# Patient Record
Sex: FEMALE | Race: WHITE | NOT HISPANIC OR LATINO
[De-identification: names, ages, dates, MRNs, and addresses within clinical notes are randomized per-mention and may not be internally consistent; named-entity substitution may affect disease eponyms.]

---

## 2017-04-17 ENCOUNTER — RX RENEWAL (OUTPATIENT)
Age: 22
End: 2017-04-17

## 2017-04-18 ENCOUNTER — RX RENEWAL (OUTPATIENT)
Age: 22
End: 2017-04-18

## 2017-05-18 ENCOUNTER — OUTPATIENT (OUTPATIENT)
Dept: OUTPATIENT SERVICES | Facility: HOSPITAL | Age: 22
LOS: 1 days | End: 2017-05-18
Payer: MEDICAID

## 2017-05-18 ENCOUNTER — APPOINTMENT (OUTPATIENT)
Dept: ENDOCRINOLOGY | Facility: HOSPITAL | Age: 22
End: 2017-05-18

## 2017-05-18 VITALS
DIASTOLIC BLOOD PRESSURE: 79 MMHG | BODY MASS INDEX: 23.05 KG/M2 | HEIGHT: 64 IN | WEIGHT: 135 LBS | SYSTOLIC BLOOD PRESSURE: 117 MMHG | RESPIRATION RATE: 16 BRPM

## 2017-05-18 DIAGNOSIS — E10.9 TYPE 1 DIABETES MELLITUS WITHOUT COMPLICATIONS: ICD-10-CM

## 2017-05-18 DIAGNOSIS — E34.9 ENDOCRINE DISORDER, UNSPECIFIED: ICD-10-CM

## 2017-05-18 LAB
24R-OH-CALCIDIOL SERPL-MCNC: 32.4 NG/ML — SIGNIFICANT CHANGE UP (ref 30–100)
ALBUMIN SERPL ELPH-MCNC: 4.2 G/DL — SIGNIFICANT CHANGE UP (ref 3.3–5)
ALP SERPL-CCNC: 51 U/L — SIGNIFICANT CHANGE UP (ref 40–120)
ALT FLD-CCNC: 12 U/L — SIGNIFICANT CHANGE UP (ref 10–45)
ANION GAP SERPL CALC-SCNC: 13 MMOL/L — SIGNIFICANT CHANGE UP (ref 5–17)
AST SERPL-CCNC: 20 U/L — SIGNIFICANT CHANGE UP (ref 10–40)
BILIRUB SERPL-MCNC: 0.4 MG/DL — SIGNIFICANT CHANGE UP (ref 0.2–1.2)
BUN SERPL-MCNC: 13 MG/DL — SIGNIFICANT CHANGE UP (ref 7–23)
CALCIUM SERPL-MCNC: 9 MG/DL — SIGNIFICANT CHANGE UP (ref 8.4–10.5)
CHLORIDE SERPL-SCNC: 98 MMOL/L — SIGNIFICANT CHANGE UP (ref 96–108)
CO2 SERPL-SCNC: 24 MMOL/L — SIGNIFICANT CHANGE UP (ref 22–31)
CREAT ?TM UR-MCNC: 164 MG/DL — SIGNIFICANT CHANGE UP
CREAT SERPL-MCNC: 0.79 MG/DL — SIGNIFICANT CHANGE UP (ref 0.5–1.3)
GLUCOSE SERPL-MCNC: 434 MG/DL — HIGH (ref 70–99)
HBA1C BLD-MCNC: 7.3 % — HIGH (ref 4–5.6)
MICROALBUMIN UR-MCNC: 5.4 MG/DL — SIGNIFICANT CHANGE UP
MICROALBUMIN/CREAT UR-RTO: 33 — SIGNIFICANT CHANGE UP
POTASSIUM SERPL-MCNC: 5.2 MMOL/L — SIGNIFICANT CHANGE UP (ref 3.5–5.3)
POTASSIUM SERPL-SCNC: 5.2 MMOL/L — SIGNIFICANT CHANGE UP (ref 3.5–5.3)
PROT SERPL-MCNC: 7.5 G/DL — SIGNIFICANT CHANGE UP (ref 6–8.3)
SODIUM SERPL-SCNC: 135 MMOL/L — SIGNIFICANT CHANGE UP (ref 135–145)
T4 FREE SERPL-MCNC: 1.3 NG/DL — SIGNIFICANT CHANGE UP (ref 0.9–1.8)
TSH SERPL-MCNC: 0.86 UIU/ML — SIGNIFICANT CHANGE UP (ref 0.27–4.2)

## 2017-05-18 PROCEDURE — 84443 ASSAY THYROID STIM HORMONE: CPT

## 2017-05-18 PROCEDURE — 80053 COMPREHEN METABOLIC PANEL: CPT

## 2017-05-18 PROCEDURE — 82306 VITAMIN D 25 HYDROXY: CPT

## 2017-05-18 PROCEDURE — G0463: CPT

## 2017-05-18 PROCEDURE — 82043 UR ALBUMIN QUANTITATIVE: CPT

## 2017-05-18 PROCEDURE — 83036 HEMOGLOBIN GLYCOSYLATED A1C: CPT

## 2017-05-18 PROCEDURE — 36415 COLL VENOUS BLD VENIPUNCTURE: CPT

## 2017-05-18 PROCEDURE — 84439 ASSAY OF FREE THYROXINE: CPT

## 2017-06-01 ENCOUNTER — RESULT REVIEW (OUTPATIENT)
Age: 22
End: 2017-06-01

## 2017-06-15 ENCOUNTER — RX RENEWAL (OUTPATIENT)
Age: 22
End: 2017-06-15

## 2017-06-22 ENCOUNTER — APPOINTMENT (OUTPATIENT)
Dept: PEDIATRIC ADOLESCENT MEDICINE | Facility: HOSPITAL | Age: 22
End: 2017-06-22

## 2017-06-22 VITALS — DIASTOLIC BLOOD PRESSURE: 61 MMHG | SYSTOLIC BLOOD PRESSURE: 126 MMHG | WEIGHT: 134 LBS | HEART RATE: 83 BPM

## 2017-06-22 DIAGNOSIS — N94.6 DYSMENORRHEA, UNSPECIFIED: ICD-10-CM

## 2017-06-27 ENCOUNTER — RX RENEWAL (OUTPATIENT)
Age: 22
End: 2017-06-27

## 2017-06-29 ENCOUNTER — RX RENEWAL (OUTPATIENT)
Age: 22
End: 2017-06-29

## 2017-09-26 ENCOUNTER — OTHER (OUTPATIENT)
Age: 22
End: 2017-09-26

## 2017-10-26 ENCOUNTER — RX RENEWAL (OUTPATIENT)
Age: 22
End: 2017-10-26

## 2017-12-07 ENCOUNTER — LABORATORY RESULT (OUTPATIENT)
Age: 22
End: 2017-12-07

## 2017-12-07 ENCOUNTER — OUTPATIENT (OUTPATIENT)
Dept: OUTPATIENT SERVICES | Facility: HOSPITAL | Age: 22
LOS: 1 days | End: 2017-12-07
Payer: MEDICAID

## 2017-12-07 ENCOUNTER — TRANSCRIPTION ENCOUNTER (OUTPATIENT)
Age: 22
End: 2017-12-07

## 2017-12-07 ENCOUNTER — APPOINTMENT (OUTPATIENT)
Dept: ENDOCRINOLOGY | Facility: HOSPITAL | Age: 22
End: 2017-12-07
Payer: MEDICAID

## 2017-12-07 VITALS
WEIGHT: 131 LBS | HEART RATE: 92 BPM | RESPIRATION RATE: 14 BRPM | BODY MASS INDEX: 22.36 KG/M2 | HEIGHT: 64 IN | DIASTOLIC BLOOD PRESSURE: 86 MMHG | SYSTOLIC BLOOD PRESSURE: 132 MMHG

## 2017-12-07 DIAGNOSIS — E06.9 THYROIDITIS, UNSPECIFIED: ICD-10-CM

## 2017-12-07 LAB
CHOLEST SERPL-MCNC: 134 MG/DL — SIGNIFICANT CHANGE UP (ref 10–199)
CREAT ?TM UR-MCNC: 196 MG/DL — SIGNIFICANT CHANGE UP
HBA1C BLD-MCNC: 6.8 % — HIGH (ref 4–5.6)
HDLC SERPL-MCNC: 57 MG/DL — SIGNIFICANT CHANGE UP (ref 40–125)
LIPID PNL WITH DIRECT LDL SERPL: 62 MG/DL — SIGNIFICANT CHANGE UP
MICROALBUMIN UR-MCNC: 1.7 MG/DL — SIGNIFICANT CHANGE UP
MICROALBUMIN/CREAT UR-RTO: 9 MG/G — SIGNIFICANT CHANGE UP (ref 0–30)
TOTAL CHOLESTEROL/HDL RATIO MEASUREMENT: 2.4 RATIO — LOW (ref 3.3–7.1)
TRIGL SERPL-MCNC: 77 MG/DL — SIGNIFICANT CHANGE UP (ref 10–149)

## 2017-12-07 PROCEDURE — 82043 UR ALBUMIN QUANTITATIVE: CPT

## 2017-12-07 PROCEDURE — 83036 HEMOGLOBIN GLYCOSYLATED A1C: CPT

## 2017-12-07 PROCEDURE — 80061 LIPID PANEL: CPT

## 2017-12-07 PROCEDURE — 99213 OFFICE O/P EST LOW 20 MIN: CPT

## 2017-12-07 PROCEDURE — G0463: CPT

## 2017-12-12 DIAGNOSIS — E10.9 TYPE 1 DIABETES MELLITUS WITHOUT COMPLICATIONS: ICD-10-CM

## 2017-12-21 ENCOUNTER — APPOINTMENT (OUTPATIENT)
Dept: PEDIATRIC ADOLESCENT MEDICINE | Facility: HOSPITAL | Age: 22
End: 2017-12-21
Payer: MEDICAID

## 2017-12-21 VITALS — DIASTOLIC BLOOD PRESSURE: 72 MMHG | HEART RATE: 85 BPM | SYSTOLIC BLOOD PRESSURE: 130 MMHG | WEIGHT: 131 LBS

## 2017-12-21 PROCEDURE — 99212 OFFICE O/P EST SF 10 MIN: CPT

## 2017-12-21 RX ORDER — DIPHENHYDRAMINE HCL 50 MG/1
50 CAPSULE ORAL EVERY 6 HOURS
Refills: 0 | Status: DISCONTINUED | COMMUNITY
Start: 2017-12-07

## 2017-12-28 ENCOUNTER — OTHER (OUTPATIENT)
Age: 22
End: 2017-12-28

## 2018-01-11 ENCOUNTER — APPOINTMENT (OUTPATIENT)
Dept: DERMATOLOGY | Facility: HOSPITAL | Age: 23
End: 2018-01-11
Payer: MEDICAID

## 2018-01-11 ENCOUNTER — OUTPATIENT (OUTPATIENT)
Dept: OUTPATIENT SERVICES | Facility: HOSPITAL | Age: 23
LOS: 1 days | End: 2018-01-11
Payer: MEDICAID

## 2018-01-11 VITALS
HEIGHT: 64 IN | WEIGHT: 131 LBS | BODY MASS INDEX: 22.36 KG/M2 | RESPIRATION RATE: 16 BRPM | HEART RATE: 91 BPM | DIASTOLIC BLOOD PRESSURE: 78 MMHG | SYSTOLIC BLOOD PRESSURE: 133 MMHG

## 2018-01-11 DIAGNOSIS — L30.9 DERMATITIS, UNSPECIFIED: ICD-10-CM

## 2018-01-11 DIAGNOSIS — L98.9 DISORDER OF THE SKIN AND SUBCUTANEOUS TISSUE, UNSPECIFIED: ICD-10-CM

## 2018-01-11 DIAGNOSIS — B07.9 VIRAL WART, UNSPECIFIED: ICD-10-CM

## 2018-01-11 PROCEDURE — 99213 OFFICE O/P EST LOW 20 MIN: CPT

## 2018-01-11 PROCEDURE — G0463: CPT

## 2018-01-22 ENCOUNTER — MEDICATION RENEWAL (OUTPATIENT)
Age: 23
End: 2018-01-22

## 2018-03-08 ENCOUNTER — MOBILE ON CALL (OUTPATIENT)
Age: 23
End: 2018-03-08

## 2018-03-08 ENCOUNTER — OUTPATIENT (OUTPATIENT)
Dept: OUTPATIENT SERVICES | Facility: HOSPITAL | Age: 23
LOS: 1 days | End: 2018-03-08
Payer: MEDICAID

## 2018-03-08 ENCOUNTER — APPOINTMENT (OUTPATIENT)
Dept: DERMATOLOGY | Facility: HOSPITAL | Age: 23
End: 2018-03-08

## 2018-03-08 VITALS
HEART RATE: 114 BPM | BODY MASS INDEX: 23.05 KG/M2 | RESPIRATION RATE: 14 BRPM | WEIGHT: 135 LBS | SYSTOLIC BLOOD PRESSURE: 139 MMHG | HEIGHT: 64 IN | DIASTOLIC BLOOD PRESSURE: 87 MMHG

## 2018-03-08 DIAGNOSIS — R23.8 OTHER SKIN CHANGES: ICD-10-CM

## 2018-03-08 DIAGNOSIS — B07.9 VIRAL WART, UNSPECIFIED: ICD-10-CM

## 2018-03-08 DIAGNOSIS — L98.9 DISORDER OF THE SKIN AND SUBCUTANEOUS TISSUE, UNSPECIFIED: ICD-10-CM

## 2018-03-08 PROCEDURE — G0463: CPT

## 2018-03-08 PROCEDURE — 17110 DESTRUCTION B9 LES UP TO 14: CPT

## 2018-04-09 ENCOUNTER — RX RENEWAL (OUTPATIENT)
Age: 23
End: 2018-04-09

## 2018-05-11 ENCOUNTER — OTHER (OUTPATIENT)
Age: 23
End: 2018-05-11

## 2018-06-05 ENCOUNTER — APPOINTMENT (OUTPATIENT)
Dept: PLASTIC SURGERY | Facility: HOSPITAL | Age: 23
End: 2018-06-05

## 2018-07-11 ENCOUNTER — APPOINTMENT (OUTPATIENT)
Dept: PEDIATRIC ADOLESCENT MEDICINE | Facility: HOSPITAL | Age: 23
End: 2018-07-11

## 2018-08-16 ENCOUNTER — OUTPATIENT (OUTPATIENT)
Dept: OUTPATIENT SERVICES | Facility: HOSPITAL | Age: 23
LOS: 1 days | End: 2018-08-16
Payer: MEDICAID

## 2018-08-16 ENCOUNTER — APPOINTMENT (OUTPATIENT)
Dept: ENDOCRINOLOGY | Facility: HOSPITAL | Age: 23
End: 2018-08-16
Payer: MEDICAID

## 2018-08-16 VITALS
WEIGHT: 128 LBS | HEART RATE: 79 BPM | SYSTOLIC BLOOD PRESSURE: 117 MMHG | HEIGHT: 64 IN | BODY MASS INDEX: 21.85 KG/M2 | RESPIRATION RATE: 16 BRPM | DIASTOLIC BLOOD PRESSURE: 76 MMHG

## 2018-08-16 DIAGNOSIS — E34.9 ENDOCRINE DISORDER, UNSPECIFIED: ICD-10-CM

## 2018-08-16 DIAGNOSIS — E10.9 TYPE 1 DIABETES MELLITUS WITHOUT COMPLICATIONS: ICD-10-CM

## 2018-08-16 LAB
ALBUMIN SERPL ELPH-MCNC: 4.8 G/DL — SIGNIFICANT CHANGE UP (ref 3.3–5)
ALP SERPL-CCNC: 55 U/L — SIGNIFICANT CHANGE UP (ref 30–120)
ALT FLD-CCNC: 12 U/L — SIGNIFICANT CHANGE UP (ref 10–45)
AST SERPL-CCNC: 19 U/L — SIGNIFICANT CHANGE UP (ref 10–40)
BILIRUB SERPL-MCNC: 0.4 MG/DL — SIGNIFICANT CHANGE UP (ref 0.2–1.2)
BUN SERPL-MCNC: 10 MG/DL — SIGNIFICANT CHANGE UP (ref 7–23)
CALCIUM SERPL-MCNC: 9.6 MG/DL — SIGNIFICANT CHANGE UP (ref 8.4–10.5)
CHLORIDE SERPL-SCNC: 98 MMOL/L — SIGNIFICANT CHANGE UP (ref 96–108)
CHOLEST SERPL-MCNC: 140 MG/DL — SIGNIFICANT CHANGE UP (ref 10–199)
CO2 SERPL-SCNC: 25 MMOL/L — SIGNIFICANT CHANGE UP (ref 22–31)
CREAT ?TM UR-MCNC: 126 MG/DL — SIGNIFICANT CHANGE UP
CREAT SERPL-MCNC: 0.67 MG/DL — SIGNIFICANT CHANGE UP (ref 0.5–1.3)
GLUCOSE SERPL-MCNC: 208 MG/DL — HIGH (ref 70–99)
HBA1C BLD-MCNC: 7.2 % — HIGH (ref 4–5.6)
HDLC SERPL-MCNC: 54 MG/DL — SIGNIFICANT CHANGE UP
LIPID PNL WITH DIRECT LDL SERPL: 73 MG/DL — SIGNIFICANT CHANGE UP
MICROALBUMIN UR-MCNC: 1.3 MG/DL — SIGNIFICANT CHANGE UP
MICROALBUMIN/CREAT UR-RTO: 10 MG/G — SIGNIFICANT CHANGE UP (ref 0–30)
POTASSIUM SERPL-MCNC: 4.5 MMOL/L — SIGNIFICANT CHANGE UP (ref 3.5–5.3)
POTASSIUM SERPL-SCNC: 4.5 MMOL/L — SIGNIFICANT CHANGE UP (ref 3.5–5.3)
PROT SERPL-MCNC: 7.6 G/DL — SIGNIFICANT CHANGE UP (ref 6–8.3)
SODIUM SERPL-SCNC: 138 MMOL/L — SIGNIFICANT CHANGE UP (ref 135–145)
TOTAL CHOLESTEROL/HDL RATIO MEASUREMENT: 2.6 RATIO — LOW (ref 3.3–7.1)
TRIGL SERPL-MCNC: 64 MG/DL — SIGNIFICANT CHANGE UP (ref 10–149)
TSH SERPL-MCNC: 1.12 UIU/ML — SIGNIFICANT CHANGE UP (ref 0.27–4.2)

## 2018-08-16 PROCEDURE — 99214 OFFICE O/P EST MOD 30 MIN: CPT | Mod: GC

## 2018-08-16 PROCEDURE — 82043 UR ALBUMIN QUANTITATIVE: CPT

## 2018-08-16 PROCEDURE — 83036 HEMOGLOBIN GLYCOSYLATED A1C: CPT

## 2018-08-16 PROCEDURE — 80053 COMPREHEN METABOLIC PANEL: CPT

## 2018-08-16 PROCEDURE — 80061 LIPID PANEL: CPT

## 2018-08-16 PROCEDURE — 84443 ASSAY THYROID STIM HORMONE: CPT

## 2018-08-16 PROCEDURE — G0463: CPT

## 2018-09-05 ENCOUNTER — APPOINTMENT (OUTPATIENT)
Dept: COLORECTAL SURGERY | Facility: CLINIC | Age: 23
End: 2018-09-05
Payer: MEDICAID

## 2018-09-05 VITALS
HEIGHT: 65 IN | BODY MASS INDEX: 21.33 KG/M2 | SYSTOLIC BLOOD PRESSURE: 115 MMHG | HEART RATE: 89 BPM | DIASTOLIC BLOOD PRESSURE: 81 MMHG | OXYGEN SATURATION: 97 % | WEIGHT: 128 LBS | RESPIRATION RATE: 14 BRPM

## 2018-09-05 DIAGNOSIS — K64.4 RESIDUAL HEMORRHOIDAL SKIN TAGS: ICD-10-CM

## 2018-09-05 DIAGNOSIS — Z87.2 PERSONAL HISTORY OF DISEASES OF THE SKIN AND SUBCUTANEOUS TISSUE: ICD-10-CM

## 2018-09-05 PROCEDURE — 99243 OFF/OP CNSLTJ NEW/EST LOW 30: CPT

## 2018-09-05 RX ORDER — 70%ISOPROPYL ALCOHOL 0.7 ML/ML
70 SWAB TOPICAL
Qty: 200 | Refills: 2 | Status: DISCONTINUED | COMMUNITY
Start: 2017-04-17 | End: 2018-09-05

## 2018-09-06 ENCOUNTER — OTHER (OUTPATIENT)
Age: 23
End: 2018-09-06

## 2018-09-06 DIAGNOSIS — R09.81 NASAL CONGESTION: ICD-10-CM

## 2018-09-12 ENCOUNTER — APPOINTMENT (OUTPATIENT)
Dept: PODIATRY | Facility: HOSPITAL | Age: 23
End: 2018-09-12

## 2018-09-17 ENCOUNTER — APPOINTMENT (OUTPATIENT)
Dept: OPHTHALMOLOGY | Facility: CLINIC | Age: 23
End: 2018-09-17

## 2018-09-27 ENCOUNTER — MED ADMIN CHARGE (OUTPATIENT)
Age: 23
End: 2018-09-27

## 2018-11-02 ENCOUNTER — RX RENEWAL (OUTPATIENT)
Age: 23
End: 2018-11-02

## 2018-12-10 ENCOUNTER — APPOINTMENT (OUTPATIENT)
Dept: PEDIATRIC ADOLESCENT MEDICINE | Facility: HOSPITAL | Age: 23
End: 2018-12-10
Payer: MEDICAID

## 2018-12-10 VITALS
WEIGHT: 124 LBS | HEIGHT: 64.37 IN | BODY MASS INDEX: 21.17 KG/M2 | HEART RATE: 75 BPM | SYSTOLIC BLOOD PRESSURE: 115 MMHG | DIASTOLIC BLOOD PRESSURE: 75 MMHG

## 2018-12-10 DIAGNOSIS — R63.4 ABNORMAL WEIGHT LOSS: ICD-10-CM

## 2018-12-10 PROCEDURE — 99213 OFFICE O/P EST LOW 20 MIN: CPT

## 2018-12-10 NOTE — PHYSICAL EXAM
[Regular Rate and Rhythm] : regular rate and rhythm [Normal S1, S2 audible] : normal S1, S2 audible [Soft] : soft [NonTender] : non tender [FreeTextEntry1] : no distress [FreeTextEntry7] : clear [FreeTextEntry8] : marryr [de-identified] : severe eczema on her entire body

## 2018-12-10 NOTE — DISCUSSION/SUMMARY
[FreeTextEntry1] : Eczema:  mother is a pharmacist and I was able to renew medications\par \par Weight loss:  Mother has appt with endocrine in 2/2019 and will discuss the matter with them.\par \par Type 1 DM:  appears to be well controlled.  mother to use sliding scale\par \par Completed form to support need for a one on one aid at all times

## 2018-12-10 NOTE — HISTORY OF PRESENT ILLNESS
[de-identified] : Eczema and form completed [FreeTextEntry6] : Patient with multiple medical issues who is uncommunicative to her Fragile X Syndrome , anxiety and a learning disorder.  Patient also has severe eczema and Type ! Diabetes Mellitus.  Mother is requesting a refill of patient's hydrocortisone ointment and completion of a form that will support the continuation of a one on one aid for the patient.  Patient has not been hospitalized in past year and has not required any acute ER visits.\par \par Noted on review of vital signs patient has been slowly losing weight over past few years.  maximum 140 pounds and now weighing 124 pounds.  Mother reports excellent appetite and food intake.  Diabetes under good control

## 2018-12-17 ENCOUNTER — RX RENEWAL (OUTPATIENT)
Age: 23
End: 2018-12-17

## 2018-12-27 ENCOUNTER — RX RENEWAL (OUTPATIENT)
Age: 23
End: 2018-12-27

## 2019-01-03 ENCOUNTER — MEDICATION RENEWAL (OUTPATIENT)
Age: 24
End: 2019-01-03

## 2019-01-28 ENCOUNTER — MEDICATION RENEWAL (OUTPATIENT)
Age: 24
End: 2019-01-28

## 2019-02-14 ENCOUNTER — OUTPATIENT (OUTPATIENT)
Dept: OUTPATIENT SERVICES | Facility: HOSPITAL | Age: 24
LOS: 1 days | End: 2019-02-14
Payer: MEDICAID

## 2019-02-14 ENCOUNTER — APPOINTMENT (OUTPATIENT)
Dept: ENDOCRINOLOGY | Facility: HOSPITAL | Age: 24
End: 2019-02-14
Payer: MEDICAID

## 2019-02-14 VITALS
HEART RATE: 8 BPM | BODY MASS INDEX: 20.83 KG/M2 | DIASTOLIC BLOOD PRESSURE: 78 MMHG | WEIGHT: 125 LBS | SYSTOLIC BLOOD PRESSURE: 129 MMHG | HEIGHT: 65 IN

## 2019-02-14 DIAGNOSIS — E10.9 TYPE 1 DIABETES MELLITUS WITHOUT COMPLICATIONS: ICD-10-CM

## 2019-02-14 DIAGNOSIS — E34.9 ENDOCRINE DISORDER, UNSPECIFIED: ICD-10-CM

## 2019-02-14 LAB — HBA1C BLD-MCNC: 6.7 % — HIGH (ref 4–5.6)

## 2019-02-14 PROCEDURE — G0463: CPT

## 2019-02-14 PROCEDURE — 86364 TISS TRNSGLTMNASE EA IG CLAS: CPT

## 2019-02-14 PROCEDURE — 83036 HEMOGLOBIN GLYCOSYLATED A1C: CPT

## 2019-02-14 PROCEDURE — 99214 OFFICE O/P EST MOD 30 MIN: CPT

## 2019-02-14 NOTE — HISTORY OF PRESENT ILLNESS
[FreeTextEntry1] : 23 year old female here for DM 1 and fragile X syndrome. \par DM 1 diagnosed at 6 years old. Controlled, A1C 7.2 Aug 2018. No nephropathy, no retinopathy, no neuropathy\par Takes levemir 21U units  and humalog based on insulin: Carb ratio as follows:\par 1:6 ac breakfast \par 1:7 ac lunch \par 1:6 ac dinner\par Francine factor 1:40\par \par TID: AM-80-180s with occasional 200s pre lunch:70s-200s with occasional 40s-50s predinner 100-200 HS .  Patient always has a bedtime snack. She may be more physically active or eating less during the days she has hypoglycemia (3-4 X month)\par The patient does not want the medic alert bracelet.\par Diet: regular food, no sugar\par Last dilated eye exam: Jan 2017.\par Regular menstrual cycles.\par Patient has started using the DEXCOM G6 -however mother does not trust the readings. She is still checking FS 4X daily. However, given frequency of hypoglycemia the DEXCOM has been a useful alert tool.

## 2019-02-14 NOTE — REVIEW OF SYSTEMS
[Negative] : Endocrine [All other systems negative] : All other systems negative [Fatigue] : no fatigue [Dry Eyes] : no dryness of the eyes [Eyes Itch] : no itching of the eyes [Chest Pain] : no chest pain [Palpitations] : no palpitations [Cough] : no cough [Nausea] : no nausea [Vomiting] : no vomiting was observed [Constipation] : no constipation [Diarrhea] : no diarrhea [Headache] : no headaches [Cold Intolerance] : cold tolerant [Heat Intolerance] : heat tolerant

## 2019-02-14 NOTE — ASSESSMENT
[FreeTextEntry1] : 24 yo woman with T1DM and Fragile X Syndrome\par \par  T1DM\par -Continue current insulin regimen.\par -Patient is overall well controlled with A1C 7.2. Has overall less hyperglycemia and hypoglycemia as compared to last visit. Hypoglycemia tends to occur when patient does not finish her meal. \par -glucagon provided for emergency purposes\par -Last microalbumin/creatinine ratio 2. Last lipid panel LDL 70 HDL 49 (Aug 2018)\par -Check A1C today\par -TSH normal when checked at last visit\par -Negative Celiac screening in past but has been >2 years so will check TG IgA Ab. \par -Due for dilated eye exam. Normal diabetic foot exam done today.\par \par \par RTC in 6 months, sooner as needed\par

## 2019-02-14 NOTE — PHYSICAL EXAM
[Alert] : alert [Well Nourished] : well nourished [No Proptosis] : no proptosis [Normal Hearing] : hearing was normal [Thyroid Not Enlarged] : the thyroid was not enlarged [No Thyroid Nodules] : there were no palpable thyroid nodules [No Respiratory Distress] : no respiratory distress [Normal Rate and Effort] : normal respiratory rhythm and effort [No Accessory Muscle Use] : no accessory muscle use [Clear to Auscultation] : lungs were clear to auscultation bilaterally [Normal Rate] : heart rate was normal  [Normal S1, S2] : normal S1 and S2 [No Edema] : there was no peripheral edema [Normal Bowel Sounds] : normal bowel sounds [Not Tender] : non-tender [Soft] : abdomen soft [No Stigmata of Cushings Syndrome] : no stigmata of cushings syndrome [Normal Gait] : normal gait [Normal Mood] : the mood was normal [Foot Ulcers] : no foot ulcers [de-identified] : syndromic appearance [de-identified] : red conjunctiva-has hx of allergies [de-identified] : has eczema on upper and lower extremities, no foot ulcers

## 2019-02-15 LAB
TTG IGA SER-ACNC: <5 UNITS — SIGNIFICANT CHANGE UP
TTG IGA SER-ACNC: NEGATIVE — SIGNIFICANT CHANGE UP

## 2019-03-18 ENCOUNTER — MEDICATION RENEWAL (OUTPATIENT)
Age: 24
End: 2019-03-18

## 2019-03-29 ENCOUNTER — MEDICATION RENEWAL (OUTPATIENT)
Age: 24
End: 2019-03-29

## 2019-04-29 ENCOUNTER — MEDICATION RENEWAL (OUTPATIENT)
Age: 24
End: 2019-04-29

## 2019-05-02 ENCOUNTER — MEDICATION RENEWAL (OUTPATIENT)
Age: 24
End: 2019-05-02

## 2019-05-24 ENCOUNTER — MEDICATION RENEWAL (OUTPATIENT)
Age: 24
End: 2019-05-24

## 2019-06-27 ENCOUNTER — APPOINTMENT (OUTPATIENT)
Dept: DERMATOLOGY | Facility: HOSPITAL | Age: 24
End: 2019-06-27
Payer: MEDICAID

## 2019-06-27 ENCOUNTER — OUTPATIENT (OUTPATIENT)
Dept: OUTPATIENT SERVICES | Facility: HOSPITAL | Age: 24
LOS: 1 days | End: 2019-06-27
Payer: MEDICAID

## 2019-06-27 VITALS
WEIGHT: 135 LBS | DIASTOLIC BLOOD PRESSURE: 83 MMHG | SYSTOLIC BLOOD PRESSURE: 133 MMHG | RESPIRATION RATE: 14 BRPM | BODY MASS INDEX: 22.49 KG/M2 | HEIGHT: 65 IN | HEART RATE: 77 BPM

## 2019-06-27 DIAGNOSIS — L98.9 DISORDER OF THE SKIN AND SUBCUTANEOUS TISSUE, UNSPECIFIED: ICD-10-CM

## 2019-06-27 PROCEDURE — G0463: CPT

## 2019-06-27 PROCEDURE — 99213 OFFICE O/P EST LOW 20 MIN: CPT

## 2019-06-27 NOTE — PHYSICAL EXAM
[Well Nourished] : well nourished [Alert] : alert [Oriented x 3] : ~L oriented x 3 [No Visual Lymphadenopathy] : no visual  lymphadenopathy [No Clubbing] : no clubbing [Conjunctiva Non-injected] : conjunctiva non-injected [No Edema] : no edema [No Bromhidrosis] : no bromhidrosis [No Chromhidrosis] : no chromhidrosis [FreeTextEntry3] : -verrucous papules L 1st and 3rd digit\par -scaly erythematous papules on arms, legs, chest, lichenification of eyelids ~60% BSA \par  \par \par

## 2019-06-27 NOTE — HISTORY OF PRESENT ILLNESS
[FreeTextEntry1] : f/u eczema, warts [de-identified] : 23 yo F Hx Fragile X, here for f/u of eczema and warts\par \par 1. Eczema - mother has been using J&J soap, moisturizing with Vaseline oint, currently flaring.  For body using difluorosone ointment BID PRN for flares, HC 2.5% oint when less severe.  For face, mother uses Protopic ointment.  Interested in starting dupixent.  \par 2. Warts - on several fingers, have tried at home compound W months ago but warts have come back.  \par \par Otherwise well, no associated signs or symptoms.\par  \par

## 2019-06-27 NOTE — ASSESSMENT
[FreeTextEntry1] : 1. Eczema, moderate to severe, ~60% BSA involved\par -C/w difluorosone ointment to AA on body BID x 2 weeks PRN for flares, SED\par -OK to c/w HC 2.5% ointment to AA BID on body x 2 weeks PRN for less severe flares, SED\par -Reviewed gentle skin care practices (gave handout with recommended products)\par -Counseled on importance of liberal use of emollients within 2-3 minutes after bathing\par -Interested in Dupixent - will send Rx but advised that may not be covered by insurance \par -F/u pending approval of dupixent \par \par 2. VV, fingers\par -Start 5-FU cream to AA daily x 2-4 weeks\par \par \par \par \par \par

## 2019-07-01 DIAGNOSIS — L30.9 DERMATITIS, UNSPECIFIED: ICD-10-CM

## 2019-07-01 DIAGNOSIS — B07.8 OTHER VIRAL WARTS: ICD-10-CM

## 2019-07-17 ENCOUNTER — RX RENEWAL (OUTPATIENT)
Age: 24
End: 2019-07-17

## 2019-09-19 ENCOUNTER — OUTPATIENT (OUTPATIENT)
Dept: OUTPATIENT SERVICES | Facility: HOSPITAL | Age: 24
LOS: 1 days | End: 2019-09-19

## 2019-09-19 ENCOUNTER — APPOINTMENT (OUTPATIENT)
Dept: INTERNAL MEDICINE | Facility: CLINIC | Age: 24
End: 2019-09-19
Payer: MEDICAID

## 2019-09-19 VITALS
BODY MASS INDEX: 21.49 KG/M2 | SYSTOLIC BLOOD PRESSURE: 100 MMHG | HEIGHT: 65 IN | DIASTOLIC BLOOD PRESSURE: 70 MMHG | OXYGEN SATURATION: 99 % | HEART RATE: 88 BPM | WEIGHT: 129 LBS

## 2019-09-19 DIAGNOSIS — Q99.2 FRAGILE X CHROMOSOME: ICD-10-CM

## 2019-09-19 DIAGNOSIS — F42.8 OTHER OBSESSIVE-COMPULSIVE DISORDER: ICD-10-CM

## 2019-09-19 DIAGNOSIS — E10.9 TYPE 1 DIABETES MELLITUS WITHOUT COMPLICATIONS: ICD-10-CM

## 2019-09-19 DIAGNOSIS — Z00.00 ENCOUNTER FOR GENERAL ADULT MEDICAL EXAMINATION WITHOUT ABNORMAL FINDINGS: ICD-10-CM

## 2019-09-19 DIAGNOSIS — Q82.4 ECTODERMAL DYSPLASIA (ANHIDROTIC): ICD-10-CM

## 2019-09-19 DIAGNOSIS — F41.9 ANXIETY DISORDER, UNSPECIFIED: ICD-10-CM

## 2019-09-19 DIAGNOSIS — R62.50 UNSPECIFIED LACK OF EXPECTED NORMAL PHYSIOLOGICAL DEVELOPMENT IN CHILDHOOD: ICD-10-CM

## 2019-09-19 DIAGNOSIS — Z00.00 ENCOUNTER FOR GENERAL ADULT MEDICAL EXAMINATION W/OUT ABNORMAL FINDINGS: ICD-10-CM

## 2019-09-19 DIAGNOSIS — L30.9 DERMATITIS, UNSPECIFIED: ICD-10-CM

## 2019-09-19 PROCEDURE — 99385 PREV VISIT NEW AGE 18-39: CPT

## 2019-09-19 NOTE — REVIEW OF SYSTEMS
[Itching] : Itching [Skin Rash] : skin rash [Fever] : no fever [Chills] : no chills [Abdominal Pain] : no abdominal pain [Vomiting] : no vomiting [Dysmenorrhea] : no dysmenorrhea [Joint Pain] : no joint pain [Muscle Pain] : no muscle pain

## 2019-09-19 NOTE — HEALTH RISK ASSESSMENT
[No] : In the past 12 months have you used drugs other than those required for medical reasons? No [Patient declined PAP Smear] : Patient declined PAP Smear [With Family] : lives with family [Student] : student [Single] : single [Fully functional (bathing, dressing, toileting, transferring, walking, feeding)] : Fully functional (bathing, dressing, toileting, transferring, walking, feeding) [Smoke Detector] : smoke detector [Carbon Monoxide Detector] : carbon monoxide detector [Safety elements used in home] : safety elements used in home [Sunscreen] : uses sunscreen [Behavior] : difficulty with behavior [Handling Complex Tasks] : difficulty handling complex tasks [Reasoning] : difficulty with reasoning [] : No [Sexually Active] : not sexually active [PapSmearComments] : discussed with parents, do not want patient to have this screening at this point in time [de-identified] : lives with parents and 2 siblings (19 and 12) [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

## 2019-09-19 NOTE — HISTORY OF PRESENT ILLNESS
[de-identified] : 24yoF with Fragile X Syndrome, Type 1 DM (last A1c 6.7%), ectodermal dysplasia, eczema, and developmental delay presents to establish care with adult medicine (has been followed by pediatrics regularly in the past). She also follows regularly with endocrinology for her T1DM and dermatology for her skin issues. Patient is here today with her parents Wendy and Rashawn.\par \par Eczema: Patient is seeing dermatology regularly. She uses various topical steroids per mother. Dermatology is considering Dupixent for treatment in the future. Mother says skin is fairly well controlled at this point in time.\par \par DM:\par -A1c 6.7% 2/2019\par -POC FS today: mother refused today, patient checks constantly on her own\par -Current medications:Humalog on sliding scale per endocrinology (usually 10-20 units per meal per family), Levemir 23 units at night\par -Home FS log: patient checks FS very frequently at home, range usually 100-200 (most FS <150)\par -Diet: follows good diet at home per parents\par -Ophthalmology follow up: has seen within the last year\par -Hypoglycemia: very rare, knows to drink juice or eat candy\par \par Behavioral issues: Mother states that patient has had persistent behavioral issues over the year. She has OCD likes behaviors with multiple repetitive actions frequently at home. She also suffers from social anxiety and will act out at times at day program or in public. Per mother, she was on some medications many years ago which patient did not react to well. They are interested in behavioral therapy and meeting with a therapist to discuss these issues.

## 2019-09-19 NOTE — PHYSICAL EXAM
[Normal Sclera/Conjunctiva] : normal sclera/conjunctiva [EOMI] : extraocular movements intact [Normal Outer Ear/Nose] : the outer ears and nose were normal in appearance [Normal Oropharynx] : the oropharynx was normal [Normal] : normal rate, regular rhythm, normal S1 and S2 and no murmur heard [Pedal Pulses Present] : the pedal pulses are present [No Edema] : there was no peripheral edema [Soft] : abdomen soft [Non Tender] : non-tender [Non-distended] : non-distended [No Masses] : no abdominal mass palpated [Normal Bowel Sounds] : normal bowel sounds [No Joint Swelling] : no joint swelling [Grossly Normal Strength/Tone] : grossly normal strength/tone [Coordination Grossly Intact] : coordination grossly intact [Normal Gait] : normal gait [Speech Grossly Normal] : speech grossly normal [Normal Mood] : the mood was normal [de-identified] : diffuse scaly areas of erythema on trunk, UEs, LEs

## 2019-09-19 NOTE — ASSESSMENT
[FreeTextEntry1] : 24yoF with Fragile X Syndrome, Type 1 DM (last A1c 6.7%), ectodermal dysplasia, eczema, and developmental delay presents to establish care.\par \par #HCM\par -Labwork: will defer today - CMP 2018 WNL, A1c 6.7% 2/2019 - plan to repeat labs with endocrinology at next visit\par -Vaccinations: need to obtain records of previous vaccinations - discussed getting flu vaccine this year with mother who states that Darcy does not usually get sick and will likely not receive this vaccine\par -Pap smear: had family discussion about cervical cancer and family declined gynecology referral today\par \par #Type 1 DM\par -A1c 6.7% earlier this year\par -FS mostly controlled per family report - endocrinology follow up scheduled later this year and will get labwork done at that visit per family\par -Continue Humalog SS and Levemir QHS\par \par #Eczema\par -Refilled fluocinonide ointment for patient today\par -Continue with regular dermatology follow up\par \par #Behavioral issues, social anxiety, OCD behaviors\par -Referral to behavioral health within our office given to patient - will discuss with  to establish if this is most appropriate setting for patient to be seen\par \par \par

## 2019-10-24 ENCOUNTER — RX RENEWAL (OUTPATIENT)
Age: 24
End: 2019-10-24

## 2019-10-24 ENCOUNTER — APPOINTMENT (OUTPATIENT)
Dept: INTERNAL MEDICINE | Facility: CLINIC | Age: 24
End: 2019-10-24

## 2019-10-24 ENCOUNTER — OUTPATIENT (OUTPATIENT)
Dept: OUTPATIENT SERVICES | Facility: HOSPITAL | Age: 24
LOS: 1 days | End: 2019-10-24

## 2019-11-04 DIAGNOSIS — N93.9 ABNORMAL UTERINE AND VAGINAL BLEEDING, UNSPECIFIED: ICD-10-CM

## 2019-11-20 ENCOUNTER — APPOINTMENT (OUTPATIENT)
Dept: OBGYN | Facility: HOSPITAL | Age: 24
End: 2019-11-20

## 2019-12-12 ENCOUNTER — APPOINTMENT (OUTPATIENT)
Dept: ENDOCRINOLOGY | Facility: HOSPITAL | Age: 24
End: 2019-12-12

## 2019-12-12 ENCOUNTER — APPOINTMENT (OUTPATIENT)
Dept: DERMATOLOGY | Facility: HOSPITAL | Age: 24
End: 2019-12-12

## 2019-12-13 ENCOUNTER — APPOINTMENT (OUTPATIENT)
Dept: INTERNAL MEDICINE | Facility: CLINIC | Age: 24
End: 2019-12-13
Payer: MEDICAID

## 2019-12-13 ENCOUNTER — OUTPATIENT (OUTPATIENT)
Dept: OUTPATIENT SERVICES | Facility: HOSPITAL | Age: 24
LOS: 1 days | End: 2019-12-13

## 2019-12-13 VITALS
HEART RATE: 81 BPM | WEIGHT: 129 LBS | BODY MASS INDEX: 21.49 KG/M2 | HEIGHT: 65 IN | DIASTOLIC BLOOD PRESSURE: 80 MMHG | SYSTOLIC BLOOD PRESSURE: 130 MMHG

## 2019-12-13 PROCEDURE — 99213 OFFICE O/P EST LOW 20 MIN: CPT

## 2019-12-13 NOTE — PHYSICAL EXAM
[No Acute Distress] : no acute distress [Well Nourished] : well nourished [Well Developed] : well developed [Well-Appearing] : well-appearing [EOMI] : extraocular movements intact [No Respiratory Distress] : no respiratory distress  [No Accessory Muscle Use] : no accessory muscle use [Clear to Auscultation] : lungs were clear to auscultation bilaterally [Normal Rate] : normal rate  [Regular Rhythm] : with a regular rhythm [Normal S1, S2] : normal S1 and S2 [Pedal Pulses Present] : the pedal pulses are present [No Edema] : there was no peripheral edema [Soft] : abdomen soft [Non Tender] : non-tender [Normal Bowel Sounds] : normal bowel sounds [Non-distended] : non-distended [Coordination Grossly Intact] : coordination grossly intact [Normal Gait] : normal gait [de-identified] : mild erythema but no bleeding or signs of infection

## 2019-12-13 NOTE — ASSESSMENT
[FreeTextEntry1] : 24yoF with Fragile X Syndrome, Type 1 DM (last A1c 6.7%), ectodermal dysplasia, eczema, and developmental delay presents for follow up.\par \par #HCM\par -Labwork: will defer today to minimize lab draws - patient should have CBC, CMP, A1c, lipids, urine microalbumin/Cr done at endocrinology visit next week\par -Vaccinations: need to obtain records of previous vaccinations, discussed with father again\par \par #Type 1 DM\par -A1c 6.7% earlier this year\par -FS mostly controlled per family report - endocrinology appointment next week\par -Continue Humalog SS and Levemir QHS\par \par #Eczema\par -Continue with protopic\par -Patient has appointment with dermatology next week\par \par #Behavioral issues, social anxiety, OCD behaviors\par -Saw BH in our office, now working with  to find appropriate resources

## 2019-12-13 NOTE — HISTORY OF PRESENT ILLNESS
[de-identified] : 24yoF presents for follow up visit and for form completion. She is here today with her father who provides most history. Since last visit, patient's mother called in with issue that periods were becoming more frequent - father states that this issue has resolved at this point. In addition, they saw behavioral health in our office but are now working with their  to find services for Darcy's anxiety and behavioral concerns. Father states that skin is getting better since they began using protopic. In addition, since they stopped using topical steroids, her FS have been better controlled.\par \par #DM: Patient will be establishing care with endocrinology at this office next week. She is still using Levemir 23 units QHS and Humalog between 10-20 units (sliding scale) pre-meal. Father states that FS are controlled at home, better since stopping steroids. Last A1c 6.7% in 2/2019 - father prefers that patient have labwork done at one time with endocrinology.\par \par #Eczema: Skin is better controlled per father with protopic. They will be seeing dermatology in this office next week to establish care.

## 2019-12-16 DIAGNOSIS — E10.9 TYPE 1 DIABETES MELLITUS WITHOUT COMPLICATIONS: ICD-10-CM

## 2019-12-16 DIAGNOSIS — L30.9 DERMATITIS, UNSPECIFIED: ICD-10-CM

## 2019-12-16 DIAGNOSIS — Q99.2 FRAGILE X CHROMOSOME: ICD-10-CM

## 2019-12-17 ENCOUNTER — LABORATORY RESULT (OUTPATIENT)
Age: 24
End: 2019-12-17

## 2019-12-17 ENCOUNTER — OUTPATIENT (OUTPATIENT)
Dept: OUTPATIENT SERVICES | Facility: HOSPITAL | Age: 24
LOS: 1 days | End: 2019-12-17

## 2019-12-17 ENCOUNTER — APPOINTMENT (OUTPATIENT)
Dept: ENDOCRINOLOGY | Facility: CLINIC | Age: 24
End: 2019-12-17
Payer: MEDICAID

## 2019-12-17 VITALS
SYSTOLIC BLOOD PRESSURE: 122 MMHG | WEIGHT: 127 LBS | OXYGEN SATURATION: 99 % | BODY MASS INDEX: 21.16 KG/M2 | HEART RATE: 100 BPM | HEIGHT: 65 IN | DIASTOLIC BLOOD PRESSURE: 62 MMHG

## 2019-12-17 DIAGNOSIS — E10.9 TYPE 1 DIABETES MELLITUS WITHOUT COMPLICATIONS: ICD-10-CM

## 2019-12-17 LAB
24R-OH-CALCIDIOL SERPL-MCNC: 34.8 NG/ML — SIGNIFICANT CHANGE UP (ref 30–80)
ALBUMIN SERPL ELPH-MCNC: 4.9 G/DL — SIGNIFICANT CHANGE UP (ref 3.3–5)
ALP SERPL-CCNC: 60 U/L — SIGNIFICANT CHANGE UP (ref 40–120)
ALT FLD-CCNC: 37 U/L — HIGH (ref 4–33)
ANION GAP SERPL CALC-SCNC: 12 MMO/L — SIGNIFICANT CHANGE UP (ref 7–14)
AST SERPL-CCNC: 24 U/L — SIGNIFICANT CHANGE UP (ref 4–32)
BASOPHILS # BLD AUTO: 0.03 K/UL — SIGNIFICANT CHANGE UP (ref 0–0.2)
BASOPHILS NFR BLD AUTO: 0.5 % — SIGNIFICANT CHANGE UP (ref 0–2)
BILIRUB SERPL-MCNC: 0.4 MG/DL — SIGNIFICANT CHANGE UP (ref 0.2–1.2)
BUN SERPL-MCNC: 13 MG/DL — SIGNIFICANT CHANGE UP (ref 7–23)
CALCIUM SERPL-MCNC: 9.7 MG/DL — SIGNIFICANT CHANGE UP (ref 8.4–10.5)
CHLORIDE SERPL-SCNC: 98 MMOL/L — SIGNIFICANT CHANGE UP (ref 98–107)
CHOLEST SERPL-MCNC: 145 MG/DL — SIGNIFICANT CHANGE UP (ref 120–199)
CO2 SERPL-SCNC: 27 MMOL/L — SIGNIFICANT CHANGE UP (ref 22–31)
CREAT SERPL-MCNC: 0.7 MG/DL — SIGNIFICANT CHANGE UP (ref 0.5–1.3)
EOSINOPHIL # BLD AUTO: 0.37 K/UL — SIGNIFICANT CHANGE UP (ref 0–0.5)
EOSINOPHIL NFR BLD AUTO: 5.7 % — SIGNIFICANT CHANGE UP (ref 0–6)
GLUCOSE SERPL-MCNC: 270 MG/DL — HIGH (ref 70–99)
HCT VFR BLD CALC: 48.4 % — HIGH (ref 34.5–45)
HDLC SERPL-MCNC: 46 MG/DL — SIGNIFICANT CHANGE UP (ref 45–65)
HGB BLD-MCNC: 15.3 G/DL — SIGNIFICANT CHANGE UP (ref 11.5–15.5)
IMM GRANULOCYTES NFR BLD AUTO: 0.2 % — SIGNIFICANT CHANGE UP (ref 0–1.5)
LIPID PNL WITH DIRECT LDL SERPL: 77 MG/DL — SIGNIFICANT CHANGE UP
LYMPHOCYTES # BLD AUTO: 1.84 K/UL — SIGNIFICANT CHANGE UP (ref 1–3.3)
LYMPHOCYTES # BLD AUTO: 28.4 % — SIGNIFICANT CHANGE UP (ref 13–44)
MCHC RBC-ENTMCNC: 28.9 PG — SIGNIFICANT CHANGE UP (ref 27–34)
MCHC RBC-ENTMCNC: 31.6 % — LOW (ref 32–36)
MCV RBC AUTO: 91.5 FL — SIGNIFICANT CHANGE UP (ref 80–100)
MONOCYTES # BLD AUTO: 0.37 K/UL — SIGNIFICANT CHANGE UP (ref 0–0.9)
MONOCYTES NFR BLD AUTO: 5.7 % — SIGNIFICANT CHANGE UP (ref 2–14)
NEUTROPHILS # BLD AUTO: 3.86 K/UL — SIGNIFICANT CHANGE UP (ref 1.8–7.4)
NEUTROPHILS NFR BLD AUTO: 59.5 % — SIGNIFICANT CHANGE UP (ref 43–77)
NRBC # FLD: 0 K/UL — SIGNIFICANT CHANGE UP (ref 0–0)
PLATELET # BLD AUTO: 422 K/UL — HIGH (ref 150–400)
PMV BLD: 9.4 FL — SIGNIFICANT CHANGE UP (ref 7–13)
POTASSIUM SERPL-MCNC: 4.2 MMOL/L — SIGNIFICANT CHANGE UP (ref 3.5–5.3)
POTASSIUM SERPL-SCNC: 4.2 MMOL/L — SIGNIFICANT CHANGE UP (ref 3.5–5.3)
PROT SERPL-MCNC: 8.2 G/DL — SIGNIFICANT CHANGE UP (ref 6–8.3)
RBC # BLD: 5.29 M/UL — HIGH (ref 3.8–5.2)
RBC # FLD: 13 % — SIGNIFICANT CHANGE UP (ref 10.3–14.5)
SODIUM SERPL-SCNC: 137 MMOL/L — SIGNIFICANT CHANGE UP (ref 135–145)
T4 FREE SERPL-MCNC: 1.31 NG/DL — SIGNIFICANT CHANGE UP (ref 0.9–1.8)
TRIGL SERPL-MCNC: 129 MG/DL — SIGNIFICANT CHANGE UP (ref 10–149)
TSH SERPL-MCNC: 0.79 UIU/ML — SIGNIFICANT CHANGE UP (ref 0.27–4.2)
WBC # BLD: 6.48 K/UL — SIGNIFICANT CHANGE UP (ref 3.8–10.5)
WBC # FLD AUTO: 6.48 K/UL — SIGNIFICANT CHANGE UP (ref 3.8–10.5)

## 2019-12-17 PROCEDURE — 99214 OFFICE O/P EST MOD 30 MIN: CPT | Mod: GC

## 2019-12-18 LAB
CREAT UR-MCNC: 191 MG/DL — SIGNIFICANT CHANGE UP
MICROALBUMIN UR-MCNC: 1.6 MG/DL — SIGNIFICANT CHANGE UP
MICROALBUMIN/CREAT UR-RTO: 8 MG/G — SIGNIFICANT CHANGE UP (ref 0–30)

## 2019-12-20 ENCOUNTER — OUTPATIENT (OUTPATIENT)
Dept: OUTPATIENT SERVICES | Facility: HOSPITAL | Age: 24
LOS: 1 days | End: 2019-12-20

## 2019-12-20 ENCOUNTER — APPOINTMENT (OUTPATIENT)
Dept: DERMATOLOGY | Facility: CLINIC | Age: 24
End: 2019-12-20
Payer: MEDICAID

## 2019-12-20 VITALS
HEART RATE: 85 BPM | BODY MASS INDEX: 21.16 KG/M2 | HEIGHT: 65 IN | WEIGHT: 127 LBS | DIASTOLIC BLOOD PRESSURE: 82 MMHG | SYSTOLIC BLOOD PRESSURE: 110 MMHG

## 2019-12-20 DIAGNOSIS — L30.9 DERMATITIS, UNSPECIFIED: ICD-10-CM

## 2019-12-20 DIAGNOSIS — B07.8 OTHER VIRAL WARTS: ICD-10-CM

## 2019-12-20 PROCEDURE — 99213 OFFICE O/P EST LOW 20 MIN: CPT

## 2019-12-24 NOTE — REVIEW OF SYSTEMS
[Negative] : Endocrine [All other systems negative] : All other systems negative [Fatigue] : no fatigue [Dry Eyes] : no dryness of the eyes [Eyes Itch] : no itching of the eyes [Chest Pain] : no chest pain [Palpitations] : no palpitations [Nausea] : no nausea [Cough] : no cough [Vomiting] : no vomiting was observed [Constipation] : no constipation [Diarrhea] : no diarrhea [Cold Intolerance] : cold tolerant [Headache] : no headaches [Heat Intolerance] : heat tolerant

## 2019-12-24 NOTE — ASSESSMENT
[Carbohydrate Consistent Diet] : carbohydrate consistent diet [Glucagon Use] : glucagon use [Hypoglycemia Management] : hypoglycemia management [Diabetes Foot Care] : diabetes foot care [Long Term Vascular Complications] : long term vascular complications of diabetes [Ketone Testing] : ketone testing [Self Monitoring of Blood Glucose] : self monitoring of blood glucose [Action and use of Insulin] : action and use of short and long-acting insulin [Importance of Diet and Exercise] : importance of diet and exercise to improve glycemic control, achieve weight loss and improve cardiovascular health [Retinopathy Screening] : Patient was referred to ophthalmology for retinopathy screening [Insulin Self-Administration] : insulin self-administration [Injection Technique, Storage, Sharps Disposal] : injection technique, storage, and sharps disposal [FreeTextEntry1] : 24 yo woman with T1DM and Fragile X Syndrome\par \par  T1DM\par -Continue current insulin regimen.\par -Patient is overall well controlled with A1C today of 6.6. Has overall less hyperglycemia and hypoglycemia as compared to last visit. Hypoglycemia tends to occur when patient does not finish her meal. \par -has glucagon at home\par -check microalb/crt todau\par -UTD with optho\par -check lipid profile\par \par HCM\par -check TSH, CBC, CMP, 25 vitamin D\par \par \par RTC in 6 months, sooner as needed\par \par D/W Dr. Gee\par

## 2019-12-24 NOTE — HISTORY OF PRESENT ILLNESS
[FreeTextEntry1] : 24 year old female here for DM 1 and fragile X syndrome. \par Accompanied by mother and father\par DM 1 diagnosed at 6 years old. Controlled, A1C 6.7 Feb 2019. No nephropathy, no retinopathy, no neuropathy\par Takes levemir 22U units  and humalog based on insulin: Carb ratio as follows:\par 1:6 ac breakfast \par 1:7 ac lunch \par 1:6 ac dinner\par Francien factor 1:40 with BGT \par \par TID: AM-80-180s with occasional 200s pre lunch:70s-200s with occasional 40s-50s predinner 100-200 HS .  Patient always has a bedtime snack. She may be more physically active or eating less during the days she has hypoglycemia (3-4 X month)\par The patient does not want the medic alert bracelet.\par Diet: regular food, no sugar\par Last dilated eye exam: 6 mos ago\par Regular menstrual cycles.- reports was on steroids topical a few months ago and during that time had high BG as well as irregular periods. Now off the topical steroids for 3 mos and back to normal.\par Patient has started using the DEXCOM G6 -however mother does not trust the readings. She is still checking FS 3-4X daily. However, given frequency of hypoglycemia the DEXCOM has been a useful alert tool.

## 2019-12-24 NOTE — PHYSICAL EXAM
[Alert] : alert [Well Nourished] : well nourished [No Proptosis] : no proptosis [Normal Hearing] : hearing was normal [Thyroid Not Enlarged] : the thyroid was not enlarged [No Thyroid Nodules] : there were no palpable thyroid nodules [No Respiratory Distress] : no respiratory distress [Normal Rate and Effort] : normal respiratory rhythm and effort [No Accessory Muscle Use] : no accessory muscle use [Clear to Auscultation] : lungs were clear to auscultation bilaterally [Normal Rate] : heart rate was normal  [Normal S1, S2] : normal S1 and S2 [No Edema] : there was no peripheral edema [Normal Bowel Sounds] : normal bowel sounds [Not Tender] : non-tender [Soft] : abdomen soft [No Stigmata of Cushings Syndrome] : no stigmata of cushings syndrome [Normal Gait] : normal gait [Normal Mood] : the mood was normal [de-identified] : syndromic appearance [Foot Ulcers] : no foot ulcers [de-identified] : red conjunctiva-has hx of allergies [de-identified] : has eczema on upper and lower extremities, no foot ulcers

## 2019-12-31 LAB — HBA1C MFR BLD HPLC: 6.6

## 2020-02-03 ENCOUNTER — RX RENEWAL (OUTPATIENT)
Age: 25
End: 2020-02-03

## 2020-03-02 ENCOUNTER — RX RENEWAL (OUTPATIENT)
Age: 25
End: 2020-03-02

## 2020-03-23 ENCOUNTER — NON-APPOINTMENT (OUTPATIENT)
Age: 25
End: 2020-03-23

## 2020-06-19 ENCOUNTER — APPOINTMENT (OUTPATIENT)
Dept: DERMATOLOGY | Facility: CLINIC | Age: 25
End: 2020-06-19
Payer: MEDICAID

## 2020-06-19 ENCOUNTER — OUTPATIENT (OUTPATIENT)
Dept: OUTPATIENT SERVICES | Facility: HOSPITAL | Age: 25
LOS: 1 days | End: 2020-06-19

## 2020-06-19 VITALS — TEMPERATURE: 98.4 F

## 2020-06-19 VITALS
BODY MASS INDEX: 21.16 KG/M2 | WEIGHT: 127 LBS | HEIGHT: 65 IN | HEART RATE: 86 BPM | SYSTOLIC BLOOD PRESSURE: 110 MMHG | DIASTOLIC BLOOD PRESSURE: 82 MMHG

## 2020-06-19 DIAGNOSIS — B07.8 OTHER VIRAL WARTS: ICD-10-CM

## 2020-06-19 PROCEDURE — 17110 DESTRUCTION B9 LES UP TO 14: CPT

## 2020-06-29 ENCOUNTER — APPOINTMENT (OUTPATIENT)
Dept: OBGYN | Facility: HOSPITAL | Age: 25
End: 2020-06-29

## 2020-07-13 ENCOUNTER — RX RENEWAL (OUTPATIENT)
Age: 25
End: 2020-07-13

## 2020-07-17 ENCOUNTER — OUTPATIENT (OUTPATIENT)
Dept: OUTPATIENT SERVICES | Facility: HOSPITAL | Age: 25
LOS: 1 days | End: 2020-07-17

## 2020-07-17 ENCOUNTER — APPOINTMENT (OUTPATIENT)
Dept: DERMATOLOGY | Facility: CLINIC | Age: 25
End: 2020-07-17
Payer: MEDICAID

## 2020-07-17 VITALS — TEMPERATURE: 97.8 F

## 2020-07-17 VITALS — WEIGHT: 124 LBS | BODY MASS INDEX: 20.66 KG/M2 | HEART RATE: 80 BPM | OXYGEN SATURATION: 99 % | HEIGHT: 65 IN

## 2020-07-17 DIAGNOSIS — B07.8 OTHER VIRAL WARTS: ICD-10-CM

## 2020-07-17 PROCEDURE — 17110 DESTRUCTION B9 LES UP TO 14: CPT

## 2020-07-20 ENCOUNTER — LABORATORY RESULT (OUTPATIENT)
Age: 25
End: 2020-07-20

## 2020-07-20 ENCOUNTER — RESULT CHARGE (OUTPATIENT)
Age: 25
End: 2020-07-20

## 2020-07-20 ENCOUNTER — APPOINTMENT (OUTPATIENT)
Dept: INTERNAL MEDICINE | Facility: CLINIC | Age: 25
End: 2020-07-20
Payer: MEDICAID

## 2020-07-20 ENCOUNTER — OUTPATIENT (OUTPATIENT)
Dept: OUTPATIENT SERVICES | Facility: HOSPITAL | Age: 25
LOS: 1 days | End: 2020-07-20

## 2020-07-20 VITALS
HEIGHT: 65 IN | BODY MASS INDEX: 20.66 KG/M2 | DIASTOLIC BLOOD PRESSURE: 75 MMHG | HEART RATE: 98 BPM | SYSTOLIC BLOOD PRESSURE: 110 MMHG | OXYGEN SATURATION: 99 % | RESPIRATION RATE: 16 BRPM | WEIGHT: 124 LBS

## 2020-07-20 VITALS — TEMPERATURE: 98.3 F

## 2020-07-20 DIAGNOSIS — F42.8 OTHER OBSESSIVE COMPULSIVE DISORDER: ICD-10-CM

## 2020-07-20 LAB
ALBUMIN SERPL ELPH-MCNC: 4.7 G/DL — SIGNIFICANT CHANGE UP (ref 3.3–5)
ALP SERPL-CCNC: 53 U/L — SIGNIFICANT CHANGE UP (ref 40–120)
ALT FLD-CCNC: 33 U/L — SIGNIFICANT CHANGE UP (ref 4–33)
ANION GAP SERPL CALC-SCNC: 10 MMO/L — SIGNIFICANT CHANGE UP (ref 7–14)
AST SERPL-CCNC: 28 U/L — SIGNIFICANT CHANGE UP (ref 4–32)
BASOPHILS # BLD AUTO: 0.04 K/UL — SIGNIFICANT CHANGE UP (ref 0–0.2)
BASOPHILS NFR BLD AUTO: 0.5 % — SIGNIFICANT CHANGE UP (ref 0–2)
BILIRUB SERPL-MCNC: 0.4 MG/DL — SIGNIFICANT CHANGE UP (ref 0.2–1.2)
BILIRUB UR QL STRIP: NORMAL
BUN SERPL-MCNC: 14 MG/DL — SIGNIFICANT CHANGE UP (ref 7–23)
CALCIUM SERPL-MCNC: 9.7 MG/DL — SIGNIFICANT CHANGE UP (ref 8.4–10.5)
CHLORIDE SERPL-SCNC: 99 MMOL/L — SIGNIFICANT CHANGE UP (ref 98–107)
CLARITY UR: CLEAR
CO2 SERPL-SCNC: 27 MMOL/L — SIGNIFICANT CHANGE UP (ref 22–31)
COLLECTION METHOD: NORMAL
CREAT SERPL-MCNC: 0.65 MG/DL — SIGNIFICANT CHANGE UP (ref 0.5–1.3)
EOSINOPHIL # BLD AUTO: 0.37 K/UL — SIGNIFICANT CHANGE UP (ref 0–0.5)
EOSINOPHIL NFR BLD AUTO: 4.3 % — SIGNIFICANT CHANGE UP (ref 0–6)
GLUCOSE SERPL-MCNC: 247 MG/DL — HIGH (ref 70–99)
GLUCOSE UR-MCNC: NORMAL
HBA1C BLD-MCNC: 7.6 % — HIGH (ref 4–5.6)
HCG UR QL: 0.2 EU/DL
HCT VFR BLD CALC: 43.3 % — SIGNIFICANT CHANGE UP (ref 34.5–45)
HGB BLD-MCNC: 14.6 G/DL — SIGNIFICANT CHANGE UP (ref 11.5–15.5)
HGB UR QL STRIP.AUTO: NORMAL
IMM GRANULOCYTES NFR BLD AUTO: 0.2 % — SIGNIFICANT CHANGE UP (ref 0–1.5)
KETONES UR-MCNC: NORMAL
LEUKOCYTE ESTERASE UR QL STRIP: NORMAL
LYMPHOCYTES # BLD AUTO: 2.3 K/UL — SIGNIFICANT CHANGE UP (ref 1–3.3)
LYMPHOCYTES # BLD AUTO: 26.6 % — SIGNIFICANT CHANGE UP (ref 13–44)
MCHC RBC-ENTMCNC: 29.9 PG — SIGNIFICANT CHANGE UP (ref 27–34)
MCHC RBC-ENTMCNC: 33.7 % — SIGNIFICANT CHANGE UP (ref 32–36)
MCV RBC AUTO: 88.7 FL — SIGNIFICANT CHANGE UP (ref 80–100)
MONOCYTES # BLD AUTO: 0.53 K/UL — SIGNIFICANT CHANGE UP (ref 0–0.9)
MONOCYTES NFR BLD AUTO: 6.1 % — SIGNIFICANT CHANGE UP (ref 2–14)
NEUTROPHILS # BLD AUTO: 5.38 K/UL — SIGNIFICANT CHANGE UP (ref 1.8–7.4)
NEUTROPHILS NFR BLD AUTO: 62.3 % — SIGNIFICANT CHANGE UP (ref 43–77)
NITRITE UR QL STRIP: NORMAL
NRBC # FLD: 0 K/UL — SIGNIFICANT CHANGE UP (ref 0–0)
PH UR STRIP: 6.5
PLATELET # BLD AUTO: 376 K/UL — SIGNIFICANT CHANGE UP (ref 150–400)
PMV BLD: 9.3 FL — SIGNIFICANT CHANGE UP (ref 7–13)
POTASSIUM SERPL-MCNC: 4.2 MMOL/L — SIGNIFICANT CHANGE UP (ref 3.5–5.3)
POTASSIUM SERPL-SCNC: 4.2 MMOL/L — SIGNIFICANT CHANGE UP (ref 3.5–5.3)
PROT SERPL-MCNC: 7.8 G/DL — SIGNIFICANT CHANGE UP (ref 6–8.3)
PROT UR STRIP-MCNC: NORMAL
RBC # BLD: 4.88 M/UL — SIGNIFICANT CHANGE UP (ref 3.8–5.2)
RBC # FLD: 12.9 % — SIGNIFICANT CHANGE UP (ref 10.3–14.5)
SODIUM SERPL-SCNC: 136 MMOL/L — SIGNIFICANT CHANGE UP (ref 135–145)
SP GR UR STRIP: 1.03
WBC # BLD: 8.64 K/UL — SIGNIFICANT CHANGE UP (ref 3.8–10.5)
WBC # FLD AUTO: 8.64 K/UL — SIGNIFICANT CHANGE UP (ref 3.8–10.5)

## 2020-07-20 PROCEDURE — 99214 OFFICE O/P EST MOD 30 MIN: CPT

## 2020-07-21 PROBLEM — F42.8 OTHER OBSESSIVE-COMPULSIVE DISORDERS: Status: ACTIVE | Noted: 2019-09-19

## 2020-07-21 NOTE — REVIEW OF SYSTEMS
[Abdominal Pain] : abdominal pain [Frequency] : frequency [Chills] : no chills [Fever] : no fever [Constipation] : no constipation [Incontinence] : no incontinence [Diarrhea] : diarrhea [Dysuria] : no dysuria [Hematuria] : no hematuria

## 2020-07-21 NOTE — ASSESSMENT
[FreeTextEntry1] : 25yoF presents for follow up visit with c/o abdominal pain.\par \par #Abdominal pain, frequent need to use bathroom\par -POC UA today with 1000 glucose but negative ketones, negative LE/nitrite -  (elevated) - unlikely urinary tract infection as symptoms ongoing for many months with no associated fevers, chills, hematuria, dysuria, CVA tenderness\par -Menstrual periods have been normal per mother's report but still advised establishing care with gynecology for full exam and evaluation\par -Will check CBC, CMP, and A1c (with elevated glucose in urine)\par -Was screened for Celiac last year with endocrinology - negative\par -Will also check abdominal US\par \par #Diabetes\par -Check A1c today\par -Encouraged endocrinology follow up\par \par #Eczema/warts\par -Continue regular dermatology follow up

## 2020-07-21 NOTE — PHYSICAL EXAM
[Well Nourished] : well nourished [No Acute Distress] : no acute distress [No Respiratory Distress] : no respiratory distress  [EOMI] : extraocular movements intact [Well-Appearing] : well-appearing [Clear to Auscultation] : lungs were clear to auscultation bilaterally [No Accessory Muscle Use] : no accessory muscle use [Normal Rate] : normal rate  [Normal S1, S2] : normal S1 and S2 [Regular Rhythm] : with a regular rhythm [No Edema] : there was no peripheral edema [Soft] : abdomen soft [Non Tender] : non-tender [Normal Bowel Sounds] : normal bowel sounds [No CVA Tenderness] : no CVA  tenderness [Normal Affect] : the affect was normal [No Spinal Tenderness] : no spinal tenderness [Normal Mood] : the mood was normal [de-identified] : soft enlarged areas below umbilicus where patient uses insulin, non-painful to palpation, no guarding, no rebound tenderness

## 2020-07-21 NOTE — HISTORY OF PRESENT ILLNESS
[de-identified] : 25yoF presents for follow up visit with mother with c/o abdominal pain x months.\par \par #Abdominal pain: Mother states that for the past 6 months patient has been c/o wanting to use bathroom very frequently. She is constantly trying to go to the bathroom and forcing herself to void. Mother states that she is having regular BM (no diarrhea). Patient is peeing very frequently (but is also drinking a lot of water). FS have been under fairly good control per mother, just checked and FS was 130, checked again during visit and FS was 223. She denies fevers/chills. Patient sometimes vomits at the beginning of her period but not at other times and this has been ongoing for a while. Her periods have been normal, not heavy, not passing clots, timing has been regular. Abdominal pain does not seem to be related to specific foods intake and her diet has not changed recently. Of note, patient also has soft tissue bumps on stomach where insulin is injected but she complains of pain in various spots all over her abdomen (not specifically these areas). Mother states that patient has tendencies where she needs to repeat actions very frequently and thinks the need to constantly be in the bathroom may be related to this - they have a  for patient's behavioral needs.\par \par #DM - A1c 6.6% in 12/2019 with endocrinology.  when just checked, 223 when repeated during visit. Taking Levemir 22 units and Humalog SS as per endocrinology.\par \par #Dermatology - Patient is following regularly with dermatology for eczema and warts - had recent treatment last week.

## 2020-07-22 ENCOUNTER — TRANSCRIPTION ENCOUNTER (OUTPATIENT)
Age: 25
End: 2020-07-22

## 2020-07-22 DIAGNOSIS — R10.9 UNSPECIFIED ABDOMINAL PAIN: ICD-10-CM

## 2020-07-22 DIAGNOSIS — L30.9 DERMATITIS, UNSPECIFIED: ICD-10-CM

## 2020-07-22 DIAGNOSIS — E10.9 TYPE 1 DIABETES MELLITUS WITHOUT COMPLICATIONS: ICD-10-CM

## 2020-07-22 DIAGNOSIS — F42.8 OTHER OBSESSIVE-COMPULSIVE DISORDER: ICD-10-CM

## 2020-08-10 ENCOUNTER — APPOINTMENT (OUTPATIENT)
Dept: ULTRASOUND IMAGING | Facility: IMAGING CENTER | Age: 25
End: 2020-08-10
Payer: MEDICAID

## 2020-08-10 ENCOUNTER — OUTPATIENT (OUTPATIENT)
Dept: OUTPATIENT SERVICES | Facility: HOSPITAL | Age: 25
LOS: 1 days | End: 2020-08-10
Payer: MEDICAID

## 2020-08-10 DIAGNOSIS — R10.9 UNSPECIFIED ABDOMINAL PAIN: ICD-10-CM

## 2020-08-10 PROCEDURE — 76700 US EXAM ABDOM COMPLETE: CPT | Mod: 26

## 2020-08-10 PROCEDURE — 76700 US EXAM ABDOM COMPLETE: CPT

## 2020-08-12 ENCOUNTER — RX RENEWAL (OUTPATIENT)
Age: 25
End: 2020-08-12

## 2020-08-21 ENCOUNTER — APPOINTMENT (OUTPATIENT)
Dept: DERMATOLOGY | Facility: CLINIC | Age: 25
End: 2020-08-21
Payer: MEDICAID

## 2020-08-21 ENCOUNTER — OUTPATIENT (OUTPATIENT)
Dept: OUTPATIENT SERVICES | Facility: HOSPITAL | Age: 25
LOS: 1 days | End: 2020-08-21

## 2020-08-21 VITALS
BODY MASS INDEX: 20.66 KG/M2 | OXYGEN SATURATION: 98 % | WEIGHT: 124 LBS | HEIGHT: 65 IN | HEART RATE: 90 BPM | SYSTOLIC BLOOD PRESSURE: 130 MMHG | DIASTOLIC BLOOD PRESSURE: 68 MMHG

## 2020-08-21 VITALS — TEMPERATURE: 98.1 F

## 2020-08-21 DIAGNOSIS — L20.9 ATOPIC DERMATITIS, UNSPECIFIED: ICD-10-CM

## 2020-08-21 DIAGNOSIS — B07.8 OTHER VIRAL WARTS: ICD-10-CM

## 2020-08-21 PROCEDURE — 17110 DESTRUCTION B9 LES UP TO 14: CPT | Mod: GC

## 2020-09-08 ENCOUNTER — NON-APPOINTMENT (OUTPATIENT)
Age: 25
End: 2020-09-08

## 2020-09-08 RX ORDER — BLOOD-GLUCOSE METER
W/DEVICE KIT MISCELLANEOUS
Qty: 1 | Refills: 0 | Status: ACTIVE | COMMUNITY
Start: 2020-09-08 | End: 1900-01-01

## 2020-09-15 ENCOUNTER — OUTPATIENT (OUTPATIENT)
Dept: OUTPATIENT SERVICES | Facility: HOSPITAL | Age: 25
LOS: 1 days | End: 2020-09-15

## 2020-09-15 ENCOUNTER — APPOINTMENT (OUTPATIENT)
Dept: ENDOCRINOLOGY | Facility: CLINIC | Age: 25
End: 2020-09-15
Payer: MEDICAID

## 2020-09-15 ENCOUNTER — RESULT CHARGE (OUTPATIENT)
Age: 25
End: 2020-09-15

## 2020-09-15 VITALS
WEIGHT: 134 LBS | BODY MASS INDEX: 22.33 KG/M2 | DIASTOLIC BLOOD PRESSURE: 78 MMHG | RESPIRATION RATE: 20 BRPM | HEIGHT: 65 IN | HEART RATE: 99 BPM | SYSTOLIC BLOOD PRESSURE: 128 MMHG | OXYGEN SATURATION: 99 %

## 2020-09-15 PROCEDURE — 99214 OFFICE O/P EST MOD 30 MIN: CPT | Mod: GC

## 2020-09-16 LAB — HBA1C MFR BLD HPLC: 7

## 2020-09-17 NOTE — REVIEW OF SYSTEMS
[Fatigue] : no fatigue [Blurred Vision] : no blurred vision [Dysphagia] : no dysphagia [Chest Pain] : no chest pain [Palpitations] : no palpitations [Nausea] : no nausea [Constipation] : no constipation [Vomiting] : no vomiting [Diarrhea] : no diarrhea

## 2020-09-17 NOTE — PHYSICAL EXAM
[Alert] : alert [Well Nourished] : well nourished [Healthy Appearance] : healthy appearance [EOMI] : extra ocular movement intact [Normal Hearing] : hearing was normal [No Neck Mass] : no neck mass was observed [No Respiratory Distress] : no respiratory distress [No Accessory Muscle Use] : no accessory muscle use [Clear to Auscultation] : lungs were clear to auscultation bilaterally [Normal S1, S2] : normal S1 and S2 [Normal Rate] : heart rate was normal [No Edema] : no peripheral edema [Not Tender] : non-tender [Soft] : abdomen soft [No Tremors] : no tremors [Oriented x3] : oriented to person, place, and time [de-identified] : generalized atopic dermatitis

## 2020-09-17 NOTE — HISTORY OF PRESENT ILLNESS
[FreeTextEntry1] : 25 year old female here for DM 1 and fragile X syndrome. \par Accompanied by father\par DM 1 diagnosed at 6 years old. 7/2020 A1c 7.6%. No nephropathy, no retinopathy, no neuropathy\par Takes levemir 26U units  and humalog based on insulin: Carb ratio as follows:\par 1:6 ac breakfast \par 1:7 ac lunch \par 1:6 ac dinner\par Francine factor 1:40 with BGT \par \par TID: AM-70-110s with occasional 200s pre lunch:70s-151s predinner 100-200 HS .  Patient always has a bedtime snack, now patient is being covered with correctional insulin. Rarely having hypoglycemic events, usually occurs around the time of menses,\par The patient does not want the medic alert bracelet.\par Diet: B: waffle and ice cream, L: eats 15g snack as she goes to day program D: hamburger and french fries or tacos with ice cream \par qhs snack: ice cream or fruits\par Last dilated eye exam: last year \par Currently having regular menses.\par Patient has started using the DEXCOM G6 -however mother does not trust the readings. She is still checking FS 3-4X daily.

## 2020-09-17 NOTE — ASSESSMENT
[Carbohydrate Consistent Diet] : carbohydrate consistent diet [Hypoglycemia Management] : hypoglycemia management [Glucagon Use] : glucagon use [Ketone Testing] : ketone testing [Diabetes Foot Care] : diabetes foot care [Long Term Vascular Complications] : long term vascular complications of diabetes [Importance of Diet and Exercise] : importance of diet and exercise to improve glycemic control, achieve weight loss and improve cardiovascular health [Action and use of Insulin] : action and use of short and long-acting insulin [Self Monitoring of Blood Glucose] : self monitoring of blood glucose [Insulin Self-Administration] : insulin self-administration [Injection Technique, Storage, Sharps Disposal] : injection technique, storage, and sharps disposal [Retinopathy Screening] : Patient was referred to ophthalmology for retinopathy screening [FreeTextEntry1] : 26 yo woman with T1DM and Fragile X Syndrome\par \par  T1DM- controlled \par - A1c trended up to 7.6% in 7/2020. POCT glucose today, 9/15/2020 7.0%\par -12/2019 microalbumin neg\par - Continue to check BG 3-4 times daily \par - Recommend continue Levemir 26units qhs \par - Recommend change CR before dinner from 1:6 to 1:5 for better glycemic control before dinner. Father will monitor her trend, if BG remain elevated, above 200s, before bedtime will consider changing CR. \par -has glucagon at home\par - Recommend follow up with ophthalmology\par \par HLD\par - 12/2019 LDL 77, will continue to monitor\par - Will repeat lipid profile at next visit \par \par HCM \par 12/2019 TSH 0.79, FT4 1.31\par 12/2019 VIt D 34.8\par Will obtain repeat labs at next visit\par \par RTC in 6 months, sooner as needed\par \par Discussed with Dr Stallworth\par \par Laureen Wagner MD\par Endocrine Fellow\par

## 2020-09-17 NOTE — END OF VISIT
[] : Fellow [FreeTextEntry3] : Agree with fellow's assessment and plan above. Recommend change in I:C to 1:5 before dinner.

## 2020-10-02 ENCOUNTER — OUTPATIENT (OUTPATIENT)
Dept: OUTPATIENT SERVICES | Facility: HOSPITAL | Age: 25
LOS: 1 days | End: 2020-10-02

## 2020-10-02 ENCOUNTER — APPOINTMENT (OUTPATIENT)
Dept: DERMATOLOGY | Facility: CLINIC | Age: 25
End: 2020-10-02
Payer: MEDICAID

## 2020-10-02 VITALS
RESPIRATION RATE: 17 BRPM | SYSTOLIC BLOOD PRESSURE: 125 MMHG | OXYGEN SATURATION: 98 % | DIASTOLIC BLOOD PRESSURE: 70 MMHG | BODY MASS INDEX: 22.33 KG/M2 | HEART RATE: 85 BPM | HEIGHT: 65 IN | WEIGHT: 134 LBS

## 2020-10-02 DIAGNOSIS — L20.9 ATOPIC DERMATITIS, UNSPECIFIED: ICD-10-CM

## 2020-10-02 DIAGNOSIS — B07.8 OTHER VIRAL WARTS: ICD-10-CM

## 2020-10-02 PROCEDURE — 17110 DESTRUCTION B9 LES UP TO 14: CPT

## 2020-10-02 PROCEDURE — 99213 OFFICE O/P EST LOW 20 MIN: CPT | Mod: 25

## 2020-11-06 ENCOUNTER — APPOINTMENT (OUTPATIENT)
Dept: DERMATOLOGY | Facility: CLINIC | Age: 25
End: 2020-11-06

## 2020-12-11 ENCOUNTER — OUTPATIENT (OUTPATIENT)
Dept: OUTPATIENT SERVICES | Facility: HOSPITAL | Age: 25
LOS: 1 days | End: 2020-12-11

## 2020-12-11 ENCOUNTER — APPOINTMENT (OUTPATIENT)
Dept: INTERNAL MEDICINE | Facility: CLINIC | Age: 25
End: 2020-12-11
Payer: MEDICAID

## 2020-12-11 VITALS
HEART RATE: 80 BPM | DIASTOLIC BLOOD PRESSURE: 80 MMHG | WEIGHT: 134 LBS | HEIGHT: 65 IN | OXYGEN SATURATION: 99 % | SYSTOLIC BLOOD PRESSURE: 120 MMHG | BODY MASS INDEX: 22.33 KG/M2

## 2020-12-11 DIAGNOSIS — R10.9 UNSPECIFIED ABDOMINAL PAIN: ICD-10-CM

## 2020-12-11 PROCEDURE — 99395 PREV VISIT EST AGE 18-39: CPT

## 2020-12-11 NOTE — HISTORY OF PRESENT ILLNESS
[de-identified] : 24yoF presents for CPE and for form completion. She is here today with her father who provides most history. \par \par #DM:\par -A1c 7.0% 9/2020 with endocrinology\par -Current medication regimen: Levemir 26 units QHS and Humalog via scale from endocrinology\par -Home FS log: have been controlled per father\par -Diet: eating waffles or poptarts for breakfast, snack at day program for lunch, hamburger or taco salad for dinner - snacks during the day on fruits and vegetables\par -Ophthalmology follow up: father thinks she has been recently\par \par #Eczema: Patient currently using protopic and following with dermatology regularly. Skin has been worse now that it is getting colder outside.\par \par #Abdominal pain: Patient is still intermittently c/o abdominal pain per father. This has been ongoing for 1-2 years per father. Menstrual periods are normal and father denies any other change in behavior, vomiting, diarrhea, constipation, change in diet, change in weight, fevers, chills.  Abdominal ultrasound done this summer WNL; CBC and CMP at that time also WNL other than elevated glucose, A1c with endocrine earlier this fall better controlled at 7.0%. \par \par #HCM\par -CBC, CMP WNL 6/20 - A1c 7.0% 9/20\par -Due for pap smear, Tdap, PPSV23 - father declining all vaccinations currently

## 2020-12-11 NOTE — ASSESSMENT
[FreeTextEntry1] : 24yoF with Fragile X Syndrome, Type 1 DM (last A1c 7.0%), ectodermal dysplasia, eczema, and developmental delay presents for CPE.\par \par #HCM\par -Labwork: CBC, CMP WNL 6/20 - A1c 7.0% 9/20, plan to repeat in 3 months with endocrine, can also repeat lipid panel at that time\par -Vaccinations: need to obtain records of previous vaccinations, discussed with father again and currently declining all vaccinations\par \par #Abdominal pain\par -Abdominal ultrasound this summer WNL, testing for celiac last year WNL\par -Advised father to monitor symptoms and see if any specific foods trigger pain\par -Patient also seems to experience more pain around time of menses, again recommended regular gynecology follow-up for full exam and evaluation\par -If symptoms continue recommended appointment with GI\par \par #Type 1 DM\par -A1c 7.0% 9/2020\par -FS mostly controlled per family report\par -Continue Humalog SS and Levemir QHS\par -Recommend endocrinology appointment in 3 months\par -Urine microalbumin ordered today\par \par #Eczema\par -Continue with protopic\par -Patient following up with dermatology

## 2020-12-11 NOTE — PHYSICAL EXAM
[No Acute Distress] : no acute distress [Well Nourished] : well nourished [Well Developed] : well developed [Well-Appearing] : well-appearing [EOMI] : extraocular movements intact [No Respiratory Distress] : no respiratory distress  [No Accessory Muscle Use] : no accessory muscle use [Clear to Auscultation] : lungs were clear to auscultation bilaterally [Normal Rate] : normal rate  [Regular Rhythm] : with a regular rhythm [Normal S1, S2] : normal S1 and S2 [Pedal Pulses Present] : the pedal pulses are present [No Edema] : there was no peripheral edema [Soft] : abdomen soft [Non Tender] : non-tender [Non-distended] : non-distended [Normal Bowel Sounds] : normal bowel sounds [Coordination Grossly Intact] : coordination grossly intact [Normal Gait] : normal gait [No Joint Swelling] : no joint swelling [Grossly Normal Strength/Tone] : grossly normal strength/tone [Normal Affect] : the affect was normal [Normal Mood] : the mood was normal [de-identified] : mild erythema but no bleeding or signs of infection

## 2020-12-11 NOTE — REVIEW OF SYSTEMS
[Abdominal Pain] : abdominal pain [Itching] : Itching [Skin Rash] : skin rash [Fever] : no fever [Chills] : no chills [Recent Change In Weight] : ~T no recent weight change [Cough] : no cough [Constipation] : no constipation [Diarrhea] : diarrhea [Vomiting] : no vomiting [Melena] : no melena [Dizziness] : no dizziness [Fainting] : no fainting

## 2020-12-11 NOTE — PAST MEDICAL HISTORY
[Menstruating] : menstruating [Normal Duration] : the duration was normal [Regular Cycle Intervals] : have been regular

## 2020-12-11 NOTE — HEALTH RISK ASSESSMENT
[No] : In the past 12 months have you used drugs other than those required for medical reasons? No [With Family] : lives with family [] : No [Behavior] : difficulty with behavior [Handling Complex Tasks] : difficulty handling complex tasks [Reasoning] : difficulty with reasoning [Sexually Active] : not sexually active [Fully functional (bathing, dressing, toileting, transferring, walking, feeding)] : Fully functional (bathing, dressing, toileting, transferring, walking, feeding) [de-identified] : in day program currently 5 days/week [de-identified] : needs help with medication management, diabetes management, most IADLs

## 2020-12-14 DIAGNOSIS — Q99.2 FRAGILE X CHROMOSOME: ICD-10-CM

## 2020-12-14 DIAGNOSIS — L30.9 DERMATITIS, UNSPECIFIED: ICD-10-CM

## 2020-12-14 DIAGNOSIS — E10.9 TYPE 1 DIABETES MELLITUS WITHOUT COMPLICATIONS: ICD-10-CM

## 2020-12-14 DIAGNOSIS — R10.9 UNSPECIFIED ABDOMINAL PAIN: ICD-10-CM

## 2020-12-14 DIAGNOSIS — Z00.00 ENCOUNTER FOR GENERAL ADULT MEDICAL EXAMINATION WITHOUT ABNORMAL FINDINGS: ICD-10-CM

## 2020-12-29 ENCOUNTER — NON-APPOINTMENT (OUTPATIENT)
Age: 25
End: 2020-12-29

## 2021-01-28 ENCOUNTER — OUTPATIENT (OUTPATIENT)
Dept: OUTPATIENT SERVICES | Facility: HOSPITAL | Age: 26
LOS: 1 days | End: 2021-01-28
Payer: MEDICAID

## 2021-01-28 ENCOUNTER — APPOINTMENT (OUTPATIENT)
Dept: DERMATOLOGY | Facility: HOSPITAL | Age: 26
End: 2021-01-28
Payer: MEDICAID

## 2021-01-28 VITALS
TEMPERATURE: 97.9 F | SYSTOLIC BLOOD PRESSURE: 133 MMHG | WEIGHT: 120 LBS | BODY MASS INDEX: 19.99 KG/M2 | HEIGHT: 65 IN | DIASTOLIC BLOOD PRESSURE: 83 MMHG | HEART RATE: 78 BPM | RESPIRATION RATE: 14 BRPM

## 2021-01-28 DIAGNOSIS — L98.9 DISORDER OF THE SKIN AND SUBCUTANEOUS TISSUE, UNSPECIFIED: ICD-10-CM

## 2021-01-28 PROCEDURE — 99214 OFFICE O/P EST MOD 30 MIN: CPT | Mod: GC,25

## 2021-01-28 PROCEDURE — G0463: CPT

## 2021-01-28 RX ORDER — DUPILUMAB 300 MG/2ML
300 INJECTION, SOLUTION SUBCUTANEOUS
Qty: 8 | Refills: 0 | Status: COMPLETED | COMMUNITY
Start: 2019-07-01 | End: 2021-01-28

## 2021-01-28 RX ORDER — DUPILUMAB 300 MG/2ML
300 INJECTION, SOLUTION SUBCUTANEOUS
Qty: 1 | Refills: 2 | Status: COMPLETED | COMMUNITY
Start: 2019-06-28 | End: 2021-01-28

## 2021-01-28 RX ORDER — DUPILUMAB 300 MG/2ML
300 INJECTION, SOLUTION SUBCUTANEOUS
Qty: 4 | Refills: 2 | Status: COMPLETED | COMMUNITY
Start: 2019-08-09 | End: 2021-01-28

## 2021-02-04 DIAGNOSIS — B07.8 OTHER VIRAL WARTS: ICD-10-CM

## 2021-02-04 DIAGNOSIS — L20.9 ATOPIC DERMATITIS, UNSPECIFIED: ICD-10-CM

## 2021-02-05 ENCOUNTER — NON-APPOINTMENT (OUTPATIENT)
Age: 26
End: 2021-02-05

## 2021-02-19 ENCOUNTER — APPOINTMENT (OUTPATIENT)
Dept: DERMATOLOGY | Facility: CLINIC | Age: 26
End: 2021-02-19

## 2021-02-25 ENCOUNTER — APPOINTMENT (OUTPATIENT)
Dept: DERMATOLOGY | Facility: HOSPITAL | Age: 26
End: 2021-02-25

## 2021-02-25 ENCOUNTER — OUTPATIENT (OUTPATIENT)
Dept: OUTPATIENT SERVICES | Facility: HOSPITAL | Age: 26
LOS: 1 days | End: 2021-02-25
Payer: MEDICAID

## 2021-02-25 VITALS
RESPIRATION RATE: 14 BRPM | DIASTOLIC BLOOD PRESSURE: 84 MMHG | WEIGHT: 127.87 LBS | HEART RATE: 101 BPM | SYSTOLIC BLOOD PRESSURE: 145 MMHG | HEIGHT: 65 IN | BODY MASS INDEX: 21.3 KG/M2

## 2021-02-25 DIAGNOSIS — B07.8 OTHER VIRAL WARTS: ICD-10-CM

## 2021-02-25 DIAGNOSIS — L98.9 DISORDER OF THE SKIN AND SUBCUTANEOUS TISSUE, UNSPECIFIED: ICD-10-CM

## 2021-02-25 DIAGNOSIS — L85.3 XEROSIS CUTIS: ICD-10-CM

## 2021-02-25 DIAGNOSIS — L20.9 ATOPIC DERMATITIS, UNSPECIFIED: ICD-10-CM

## 2021-02-25 PROCEDURE — G0463: CPT

## 2021-02-25 PROCEDURE — 17110 DESTRUCTION B9 LES UP TO 14: CPT

## 2021-03-11 ENCOUNTER — NON-APPOINTMENT (OUTPATIENT)
Age: 26
End: 2021-03-11

## 2021-03-12 ENCOUNTER — RX RENEWAL (OUTPATIENT)
Age: 26
End: 2021-03-12

## 2021-04-12 ENCOUNTER — RX RENEWAL (OUTPATIENT)
Age: 26
End: 2021-04-12

## 2021-05-07 ENCOUNTER — OUTPATIENT (OUTPATIENT)
Dept: OUTPATIENT SERVICES | Facility: HOSPITAL | Age: 26
LOS: 1 days | End: 2021-05-07

## 2021-05-07 ENCOUNTER — APPOINTMENT (OUTPATIENT)
Dept: DERMATOLOGY | Facility: CLINIC | Age: 26
End: 2021-05-07
Payer: MEDICAID

## 2021-05-07 VITALS
HEIGHT: 65 IN | SYSTOLIC BLOOD PRESSURE: 118 MMHG | BODY MASS INDEX: 21.83 KG/M2 | HEART RATE: 108 BPM | DIASTOLIC BLOOD PRESSURE: 70 MMHG | WEIGHT: 131 LBS | OXYGEN SATURATION: 98 %

## 2021-05-07 VITALS — TEMPERATURE: 97.3 F

## 2021-05-07 PROCEDURE — 17110 DESTRUCTION B9 LES UP TO 14: CPT | Mod: GC

## 2021-05-07 PROCEDURE — 99214 OFFICE O/P EST MOD 30 MIN: CPT | Mod: GC,25

## 2021-05-10 DIAGNOSIS — L20.9 ATOPIC DERMATITIS, UNSPECIFIED: ICD-10-CM

## 2021-05-10 DIAGNOSIS — B00.1 HERPESVIRAL VESICULAR DERMATITIS: ICD-10-CM

## 2021-05-10 DIAGNOSIS — B07.8 OTHER VIRAL WARTS: ICD-10-CM

## 2021-05-13 ENCOUNTER — APPOINTMENT (OUTPATIENT)
Dept: DERMATOLOGY | Facility: HOSPITAL | Age: 26
End: 2021-05-13

## 2021-05-18 ENCOUNTER — OUTPATIENT (OUTPATIENT)
Dept: OUTPATIENT SERVICES | Facility: HOSPITAL | Age: 26
LOS: 1 days | End: 2021-05-18

## 2021-05-18 ENCOUNTER — APPOINTMENT (OUTPATIENT)
Dept: ENDOCRINOLOGY | Facility: CLINIC | Age: 26
End: 2021-05-18
Payer: MEDICAID

## 2021-05-18 ENCOUNTER — RESULT REVIEW (OUTPATIENT)
Age: 26
End: 2021-05-18

## 2021-05-18 VITALS
BODY MASS INDEX: 21.83 KG/M2 | WEIGHT: 131 LBS | HEIGHT: 65 IN | HEART RATE: 110 BPM | OXYGEN SATURATION: 97 % | SYSTOLIC BLOOD PRESSURE: 110 MMHG | DIASTOLIC BLOOD PRESSURE: 70 MMHG

## 2021-05-18 VITALS — TEMPERATURE: 97.2 F

## 2021-05-18 LAB
A1C WITH ESTIMATED AVERAGE GLUCOSE RESULT: 7.7 % — HIGH (ref 4–5.6)
ALBUMIN SERPL ELPH-MCNC: 4.3 G/DL — SIGNIFICANT CHANGE UP (ref 3.3–5)
ALP SERPL-CCNC: 57 U/L — SIGNIFICANT CHANGE UP (ref 40–120)
ALT FLD-CCNC: 13 U/L — SIGNIFICANT CHANGE UP (ref 4–33)
ANION GAP SERPL CALC-SCNC: 12 MMOL/L — SIGNIFICANT CHANGE UP (ref 7–14)
AST SERPL-CCNC: 13 U/L — SIGNIFICANT CHANGE UP (ref 4–32)
BILIRUB SERPL-MCNC: 0.2 MG/DL — SIGNIFICANT CHANGE UP (ref 0.2–1.2)
BUN SERPL-MCNC: 13 MG/DL — SIGNIFICANT CHANGE UP (ref 7–23)
CALCIUM SERPL-MCNC: 9.4 MG/DL — SIGNIFICANT CHANGE UP (ref 8.4–10.5)
CHLORIDE SERPL-SCNC: 98 MMOL/L — SIGNIFICANT CHANGE UP (ref 98–107)
CHOLEST SERPL-MCNC: 142 MG/DL — SIGNIFICANT CHANGE UP
CO2 SERPL-SCNC: 26 MMOL/L — SIGNIFICANT CHANGE UP (ref 22–31)
COVID-19 SPIKE DOMAIN AB INTERP: NEGATIVE — SIGNIFICANT CHANGE UP
COVID-19 SPIKE DOMAIN ANTIBODY RESULT: 0.4 U/ML — SIGNIFICANT CHANGE UP
CREAT SERPL-MCNC: 0.59 MG/DL — SIGNIFICANT CHANGE UP (ref 0.5–1.3)
ESTIMATED AVERAGE GLUCOSE: 174 MG/DL — HIGH (ref 68–114)
GLUCOSE SERPL-MCNC: 318 MG/DL — HIGH (ref 70–99)
HDLC SERPL-MCNC: 49 MG/DL — LOW
LIPID PNL WITH DIRECT LDL SERPL: 70 MG/DL — SIGNIFICANT CHANGE UP
NON HDL CHOLESTEROL: 93 MG/DL — SIGNIFICANT CHANGE UP
POTASSIUM SERPL-MCNC: 4.3 MMOL/L — SIGNIFICANT CHANGE UP (ref 3.5–5.3)
POTASSIUM SERPL-SCNC: 4.3 MMOL/L — SIGNIFICANT CHANGE UP (ref 3.5–5.3)
PROT SERPL-MCNC: 7.6 G/DL — SIGNIFICANT CHANGE UP (ref 6–8.3)
SARS-COV-2 IGG+IGM SERPL QL IA: 0.4 U/ML — SIGNIFICANT CHANGE UP
SARS-COV-2 IGG+IGM SERPL QL IA: NEGATIVE — SIGNIFICANT CHANGE UP
SODIUM SERPL-SCNC: 136 MMOL/L — SIGNIFICANT CHANGE UP (ref 135–145)
TRIGL SERPL-MCNC: 117 MG/DL — SIGNIFICANT CHANGE UP

## 2021-05-18 PROCEDURE — 99214 OFFICE O/P EST MOD 30 MIN: CPT | Mod: GC

## 2021-05-19 DIAGNOSIS — Z00.00 ENCOUNTER FOR GENERAL ADULT MEDICAL EXAMINATION WITHOUT ABNORMAL FINDINGS: ICD-10-CM

## 2021-05-19 DIAGNOSIS — E78.5 HYPERLIPIDEMIA, UNSPECIFIED: ICD-10-CM

## 2021-05-19 DIAGNOSIS — E10.9 TYPE 1 DIABETES MELLITUS WITHOUT COMPLICATIONS: ICD-10-CM

## 2021-05-19 NOTE — PHYSICAL EXAM
[Alert] : alert [Well Nourished] : well nourished [Healthy Appearance] : healthy appearance [EOMI] : extra ocular movement intact [Normal Hearing] : hearing was normal [No Neck Mass] : no neck mass was observed [No Respiratory Distress] : no respiratory distress [No Accessory Muscle Use] : no accessory muscle use [Clear to Auscultation] : lungs were clear to auscultation bilaterally [Normal S1, S2] : normal S1 and S2 [Normal Rate] : heart rate was normal [No Edema] : no peripheral edema [Not Tender] : non-tender [Soft] : abdomen soft [No Tremors] : no tremors [Oriented x3] : oriented to person, place, and time [No Thyroid Nodules] : no palpable thyroid nodules [Normal Sensation on Monofilament Testing] : normal sensation on monofilament testing of lower extremities [Normal Affect] : the affect was normal [Normal Mood] : the mood was normal [de-identified] : generalized atopic dermatitis

## 2021-05-19 NOTE — HISTORY OF PRESENT ILLNESS
[FreeTextEntry1] : 25 year old female here for DM 1 and fragile X syndrome. \par Accompanied by father\par \par DM 1 diagnosed at 6 years old. 9/2020 A1c 7.0%. No nephropathy, no retinopathy, no neuropathy\par Takes levemir 23 units  and humalog based on insulin: Carb ratio as follows:\par 1:6 ac breakfast \par Does not give pre lunch insulin as patient goes to day problem and only has small snack\par 1:5 ac dinner\par Francine factor 1:40 with BGT \par \par TID: 8:30 AM-10] -140s with occasional 200s around the time of menses 4pm predinner 100-200 9pm HS .  \par Patient always has a bedtime snack. Rarely having hypoglycemic events\par The patient does not want the medic alert bracelet.\par Diet: \par B: waffle and cereal and fruits (apple orange strawberry)  \par L: eats 25g snack (fruit cup/ protein bar/yogurt) as she goes to day program \par D: hamburger and french fries or taco salad, chicken nuggets and fries \par qhs snack: ice cream or fruits\par Last dilated eye exam: possibly in last year \par Currently having regular menses.\par Patient has started using the DEXCOM G6 -however still checking FS as father reports difference in readings

## 2021-05-19 NOTE — REVIEW OF SYSTEMS
[All other systems negative] : All other systems negative [Fatigue] : no fatigue [Blurred Vision] : no blurred vision [Dysphagia] : no dysphagia [Chest Pain] : no chest pain [Palpitations] : no palpitations [Shortness Of Breath] : no shortness of breath [Nausea] : no nausea [Constipation] : no constipation [Vomiting] : no vomiting [Diarrhea] : no diarrhea

## 2021-05-19 NOTE — ASSESSMENT
[Carbohydrate Consistent Diet] : carbohydrate consistent diet [Hypoglycemia Management] : hypoglycemia management [Glucagon Use] : glucagon use [Ketone Testing] : ketone testing [Diabetes Foot Care] : diabetes foot care [Long Term Vascular Complications] : long term vascular complications of diabetes [Importance of Diet and Exercise] : importance of diet and exercise to improve glycemic control, achieve weight loss and improve cardiovascular health [Action and use of Insulin] : action and use of short and long-acting insulin [Self Monitoring of Blood Glucose] : self monitoring of blood glucose [Insulin Self-Administration] : insulin self-administration [Injection Technique, Storage, Sharps Disposal] : injection technique, storage, and sharps disposal [Retinopathy Screening] : Patient was referred to ophthalmology for retinopathy screening [FreeTextEntry1] : 26 yo woman with T1DM and Fragile X Syndrome\par \par  T1DM- controlled \par - 9/15/2020 POCT A1c 7.0%, will obtain A1c today \par -12/2019 microalbumin neg\par - Recommend continue DEXCOM CGM\par - Recommend continue Levemir 23units qhs \par - Recommend change CR before dinner from 1:5 to 1:4 for better glycemic control before dinner when eating carb heavy meals. Father will monitor her trend, if BG remain elevated, above 200s, before bedtime will consider changing CR. \par - has glucagon at home\par - Recommend follow up with ophthalmology\par \par HLD\par - 12/2019 LDL 77\par - Will repeat lipid profile today \par \par HCM \par 12/2019 TSH 0.79, FT4 1.31\par 12/2019 VIt D 34.8\par Will obtain COVID Ab testing per father's request \par \par RTC in 6 months, sooner as needed\par \par Discussed with Dr Julien \par \par Laureen Wagner MD\par Endocrine Fellow\par

## 2021-05-20 ENCOUNTER — RESULT REVIEW (OUTPATIENT)
Age: 26
End: 2021-05-20

## 2021-05-20 ENCOUNTER — NON-APPOINTMENT (OUTPATIENT)
Age: 26
End: 2021-05-20

## 2021-05-21 LAB
CREAT ?TM UR-MCNC: 86 MG/DL — SIGNIFICANT CHANGE UP
MICROALBUMIN UR-MCNC: <1.2 MG/DL — SIGNIFICANT CHANGE UP
MICROALBUMIN/CREAT UR-RTO: SIGNIFICANT CHANGE UP MG/G (ref 0–30)

## 2021-06-04 ENCOUNTER — APPOINTMENT (OUTPATIENT)
Dept: DERMATOLOGY | Facility: CLINIC | Age: 26
End: 2021-06-04

## 2021-06-14 ENCOUNTER — RX RENEWAL (OUTPATIENT)
Age: 26
End: 2021-06-14

## 2021-07-12 ENCOUNTER — RX RENEWAL (OUTPATIENT)
Age: 26
End: 2021-07-12

## 2021-07-22 ENCOUNTER — NON-APPOINTMENT (OUTPATIENT)
Age: 26
End: 2021-07-22

## 2021-07-22 DIAGNOSIS — Q15.9 CONGENITAL MALFORMATION OF EYE, UNSPECIFIED: ICD-10-CM

## 2021-08-03 ENCOUNTER — RX RENEWAL (OUTPATIENT)
Age: 26
End: 2021-08-03

## 2021-08-20 ENCOUNTER — OUTPATIENT (OUTPATIENT)
Dept: OUTPATIENT SERVICES | Facility: HOSPITAL | Age: 26
LOS: 1 days | End: 2021-08-20

## 2021-08-20 ENCOUNTER — APPOINTMENT (OUTPATIENT)
Dept: DERMATOLOGY | Facility: CLINIC | Age: 26
End: 2021-08-20
Payer: MEDICAID

## 2021-08-20 VITALS
SYSTOLIC BLOOD PRESSURE: 120 MMHG | HEART RATE: 100 BPM | BODY MASS INDEX: 21.83 KG/M2 | WEIGHT: 131 LBS | DIASTOLIC BLOOD PRESSURE: 82 MMHG | OXYGEN SATURATION: 98 % | HEIGHT: 65 IN

## 2021-08-20 VITALS — TEMPERATURE: 97.1 F

## 2021-08-20 DIAGNOSIS — B00.1 HERPESVIRAL VESICULAR DERMATITIS: ICD-10-CM

## 2021-08-20 DIAGNOSIS — B07.8 OTHER VIRAL WARTS: ICD-10-CM

## 2021-08-20 DIAGNOSIS — L20.9 ATOPIC DERMATITIS, UNSPECIFIED: ICD-10-CM

## 2021-08-20 PROCEDURE — 99214 OFFICE O/P EST MOD 30 MIN: CPT | Mod: 25

## 2021-08-20 PROCEDURE — 17110 DESTRUCTION B9 LES UP TO 14: CPT

## 2021-08-24 ENCOUNTER — NON-APPOINTMENT (OUTPATIENT)
Age: 26
End: 2021-08-24

## 2021-08-24 ENCOUNTER — APPOINTMENT (OUTPATIENT)
Dept: ENDOCRINOLOGY | Facility: CLINIC | Age: 26
End: 2021-08-24
Payer: MEDICAID

## 2021-08-24 ENCOUNTER — OUTPATIENT (OUTPATIENT)
Dept: OUTPATIENT SERVICES | Facility: HOSPITAL | Age: 26
LOS: 1 days | End: 2021-08-24

## 2021-08-24 VITALS
HEART RATE: 96 BPM | SYSTOLIC BLOOD PRESSURE: 128 MMHG | RESPIRATION RATE: 16 BRPM | BODY MASS INDEX: 21.7 KG/M2 | DIASTOLIC BLOOD PRESSURE: 76 MMHG | WEIGHT: 130.25 LBS | HEIGHT: 65 IN | OXYGEN SATURATION: 99 %

## 2021-08-24 PROCEDURE — 99214 OFFICE O/P EST MOD 30 MIN: CPT | Mod: GC

## 2021-08-27 NOTE — PHYSICAL EXAM
[Alert] : alert [Well Nourished] : well nourished [Healthy Appearance] : healthy appearance [EOMI] : extra ocular movement intact [Normal Hearing] : hearing was normal [No Neck Mass] : no neck mass was observed [No Thyroid Nodules] : no palpable thyroid nodules [No Respiratory Distress] : no respiratory distress [No Accessory Muscle Use] : no accessory muscle use [Clear to Auscultation] : lungs were clear to auscultation bilaterally [Normal S1, S2] : normal S1 and S2 [Normal Rate] : heart rate was normal [No Edema] : no peripheral edema [Not Tender] : non-tender [Soft] : abdomen soft [No Tremors] : no tremors [Normal Sensation on Monofilament Testing] : normal sensation on monofilament testing of lower extremities [Oriented x3] : oriented to person, place, and time [Normal Affect] : the affect was normal [Normal Mood] : the mood was normal [de-identified] : generalized atopic dermatitis

## 2021-08-27 NOTE — REVIEW OF SYSTEMS
[All other systems negative] : All other systems negative [Eyes Itch] : itch [Redness] : redness [Fatigue] : no fatigue [Blurred Vision] : no blurred vision [Dysphagia] : no dysphagia [Chest Pain] : no chest pain [Palpitations] : no palpitations [Shortness Of Breath] : no shortness of breath [Nausea] : no nausea [Constipation] : no constipation [Vomiting] : no vomiting [Diarrhea] : no diarrhea

## 2021-08-27 NOTE — HISTORY OF PRESENT ILLNESS
[FreeTextEntry1] : 26 year old female here for DM 1 and fragile X syndrome. \par Accompanied by father\par \par DM 1 diagnosed at 6 years old. 5/2021 A1c 7.7%. No nephropathy, no retinopathy, no neuropathy\par Father down titrated levemir from 23 to 20 units after he noted patient shikha having asymptomatic overnight hypoglycemia, down to 50s. Remains on humalog was Carb ratio as follows:\par 1:5 ac breakfast \par Does not give pre lunch insulin as patient goes to day program and only has small snack\par 1:5 ac dinner\par Francine factor 1:40 with BGT \par \par TID: 8:30 AM-100 -160s with occasional 200s, trending down to 70-80s  to 100at 2pm , predinner 140-220 9pm HS .  \par Patient always has a bedtime snack. \par The patient does not want the medic alert bracelet.\par Diet: \par B: waffle and cereal and fruits (apple orange strawberry)  \par L: eats 25g snack (fruit cup/ protein bar/yogurt) as she goes to day program \par D: hamburger and french fries or taco salad, chicken nuggets and fries \par qhs snack: ice cream or fruits\par Last dilated eye exam: 6/2021, no DM retinopathy \par Currently having regular menses. father not BG trend up around time of menses \par Patient has started using the DEXCOM G6 -however still checking FS as father reports difference in readings

## 2021-08-28 DIAGNOSIS — E10.9 TYPE 1 DIABETES MELLITUS WITHOUT COMPLICATIONS: ICD-10-CM

## 2021-08-28 DIAGNOSIS — E78.5 HYPERLIPIDEMIA, UNSPECIFIED: ICD-10-CM

## 2021-08-31 ENCOUNTER — NON-APPOINTMENT (OUTPATIENT)
Age: 26
End: 2021-08-31

## 2021-08-31 ENCOUNTER — RESULT CHARGE (OUTPATIENT)
Age: 26
End: 2021-08-31

## 2021-08-31 LAB — GLUCOSE BLDC GLUCOMTR-MCNC: 167

## 2021-09-07 ENCOUNTER — RX RENEWAL (OUTPATIENT)
Age: 26
End: 2021-09-07

## 2021-10-04 ENCOUNTER — RX RENEWAL (OUTPATIENT)
Age: 26
End: 2021-10-04

## 2021-10-14 ENCOUNTER — NON-APPOINTMENT (OUTPATIENT)
Age: 26
End: 2021-10-14

## 2021-10-25 ENCOUNTER — RX RENEWAL (OUTPATIENT)
Age: 26
End: 2021-10-25

## 2021-11-09 ENCOUNTER — NON-APPOINTMENT (OUTPATIENT)
Age: 26
End: 2021-11-09

## 2021-11-29 ENCOUNTER — RX RENEWAL (OUTPATIENT)
Age: 26
End: 2021-11-29

## 2021-12-02 RX ORDER — DUPILUMAB 300 MG/2ML
300 INJECTION, SOLUTION SUBCUTANEOUS
Qty: 1 | Refills: 0 | Status: COMPLETED | COMMUNITY
Start: 2021-01-28 | End: 2021-12-02

## 2021-12-02 RX ORDER — DUPILUMAB 300 MG/2ML
300 INJECTION, SOLUTION SUBCUTANEOUS
Qty: 1 | Refills: 1 | Status: COMPLETED | COMMUNITY
Start: 2021-02-25 | End: 2021-12-02

## 2021-12-02 RX ORDER — RUXOLITINIB 15 MG/G
1.5 CREAM TOPICAL
Qty: 1 | Refills: 3 | Status: COMPLETED | COMMUNITY
Start: 2021-10-19 | End: 2021-12-02

## 2021-12-06 ENCOUNTER — RESULT CHARGE (OUTPATIENT)
Age: 26
End: 2021-12-06

## 2021-12-06 ENCOUNTER — OUTPATIENT (OUTPATIENT)
Dept: OUTPATIENT SERVICES | Facility: HOSPITAL | Age: 26
LOS: 1 days | End: 2021-12-06

## 2021-12-06 ENCOUNTER — APPOINTMENT (OUTPATIENT)
Dept: INTERNAL MEDICINE | Facility: CLINIC | Age: 26
End: 2021-12-06
Payer: MEDICAID

## 2021-12-06 ENCOUNTER — APPOINTMENT (OUTPATIENT)
Dept: INTERNAL MEDICINE | Facility: CLINIC | Age: 26
End: 2021-12-06

## 2021-12-06 VITALS
DIASTOLIC BLOOD PRESSURE: 78 MMHG | BODY MASS INDEX: 20.83 KG/M2 | HEIGHT: 65 IN | WEIGHT: 125 LBS | RESPIRATION RATE: 16 BRPM | OXYGEN SATURATION: 99 % | HEART RATE: 96 BPM | SYSTOLIC BLOOD PRESSURE: 112 MMHG

## 2021-12-06 DIAGNOSIS — Q99.2 FRAGILE X CHROMOSOME: ICD-10-CM

## 2021-12-06 DIAGNOSIS — Q82.4 ECTODERMAL DYSPLASIA (ANHIDROTIC): ICD-10-CM

## 2021-12-06 DIAGNOSIS — F41.9 ANXIETY DISORDER, UNSPECIFIED: ICD-10-CM

## 2021-12-06 DIAGNOSIS — L21.0 SEBORRHEA CAPITIS: ICD-10-CM

## 2021-12-06 DIAGNOSIS — L30.9 DERMATITIS, UNSPECIFIED: ICD-10-CM

## 2021-12-06 DIAGNOSIS — R62.50 UNSPECIFIED LACK OF EXPECTED NORMAL PHYSIOLOGICAL DEVELOPMENT IN CHILDHOOD: ICD-10-CM

## 2021-12-06 DIAGNOSIS — E10.9 TYPE 1 DIABETES MELLITUS WITHOUT COMPLICATIONS: ICD-10-CM

## 2021-12-06 DIAGNOSIS — L20.9 ATOPIC DERMATITIS, UNSPECIFIED: ICD-10-CM

## 2021-12-06 PROCEDURE — 99214 OFFICE O/P EST MOD 30 MIN: CPT

## 2021-12-06 RX ORDER — KETOCONAZOLE 1 %
1 SHAMPOO TOPICAL
Qty: 1 | Refills: 2 | Status: ACTIVE | COMMUNITY
Start: 2021-12-06 | End: 1900-01-01

## 2021-12-06 NOTE — ASSESSMENT
[FreeTextEntry1] : 26F with DM, eczema, and abdominal pain comes for follow up\par - DM: xaji6g=7.2\par had urine microalbumin checked earlier this year\par - eczema/ ectodermal dysplasia: f/u derm\par - fragile x-syndrome with muscle weakness: PT and OT referral provided. \par - health maintenance: declined flu shot. no COVID vaccine

## 2021-12-06 NOTE — HISTORY OF PRESENT ILLNESS
[FreeTextEntry1] : follow up [de-identified] : patient is present with her mother and father. \par \par \par 26F with DM, eczema, and abdominal pain comes for follow up\par \par abdominal pain: continues to have abdominal cramp around her period

## 2021-12-06 NOTE — PHYSICAL EXAM
[No Acute Distress] : no acute distress [Well Nourished] : well nourished [Normal Sclera/Conjunctiva] : normal sclera/conjunctiva [No Respiratory Distress] : no respiratory distress  [No Accessory Muscle Use] : no accessory muscle use [Regular Rhythm] : with a regular rhythm [Normal S1, S2] : normal S1 and S2 [No Murmur] : no murmur heard [Soft] : abdomen soft [Non Tender] : non-tender [Non-distended] : non-distended [No Masses] : no abdominal mass palpated [No HSM] : no HSM [Normal Bowel Sounds] : normal bowel sounds [de-identified] : erythematous, atopic skin. dry skin, patches of scab at thigh

## 2021-12-06 NOTE — REVIEW OF SYSTEMS
[Fever] : no fever [Chills] : no chills [Recent Change In Weight] : ~T no recent weight change [Abdominal Pain] : abdominal pain [Muscle Weakness] : muscle weakness [Itching] : Itching [Skin Rash] : skin rash

## 2021-12-09 DIAGNOSIS — Z74.09 OTHER REDUCED MOBILITY: ICD-10-CM

## 2021-12-27 ENCOUNTER — RX RENEWAL (OUTPATIENT)
Age: 26
End: 2021-12-27

## 2022-01-03 ENCOUNTER — RX RENEWAL (OUTPATIENT)
Age: 27
End: 2022-01-03

## 2022-01-07 ENCOUNTER — APPOINTMENT (OUTPATIENT)
Dept: DERMATOLOGY | Facility: CLINIC | Age: 27
End: 2022-01-07
Payer: MEDICAID

## 2022-01-07 ENCOUNTER — OUTPATIENT (OUTPATIENT)
Dept: OUTPATIENT SERVICES | Facility: HOSPITAL | Age: 27
LOS: 1 days | End: 2022-01-07

## 2022-01-07 VITALS
HEART RATE: 101 BPM | RESPIRATION RATE: 16 BRPM | WEIGHT: 128 LBS | DIASTOLIC BLOOD PRESSURE: 70 MMHG | HEIGHT: 65 IN | SYSTOLIC BLOOD PRESSURE: 124 MMHG | BODY MASS INDEX: 21.33 KG/M2 | OXYGEN SATURATION: 99 %

## 2022-01-07 DIAGNOSIS — L20.9 ATOPIC DERMATITIS, UNSPECIFIED: ICD-10-CM

## 2022-01-07 DIAGNOSIS — B07.9 VIRAL WART, UNSPECIFIED: ICD-10-CM

## 2022-01-07 PROCEDURE — 99214 OFFICE O/P EST MOD 30 MIN: CPT

## 2022-01-21 ENCOUNTER — APPOINTMENT (OUTPATIENT)
Dept: DERMATOLOGY | Facility: CLINIC | Age: 27
End: 2022-01-21

## 2022-01-21 ENCOUNTER — NON-APPOINTMENT (OUTPATIENT)
Age: 27
End: 2022-01-21

## 2022-01-24 ENCOUNTER — NON-APPOINTMENT (OUTPATIENT)
Age: 27
End: 2022-01-24

## 2022-01-31 ENCOUNTER — NON-APPOINTMENT (OUTPATIENT)
Age: 27
End: 2022-01-31

## 2022-01-31 ENCOUNTER — APPOINTMENT (OUTPATIENT)
Dept: INTERNAL MEDICINE | Facility: CLINIC | Age: 27
End: 2022-01-31

## 2022-02-04 ENCOUNTER — APPOINTMENT (OUTPATIENT)
Dept: INTERNAL MEDICINE | Facility: CLINIC | Age: 27
End: 2022-02-04

## 2022-02-04 ENCOUNTER — RESULT REVIEW (OUTPATIENT)
Age: 27
End: 2022-02-04

## 2022-02-04 ENCOUNTER — OUTPATIENT (OUTPATIENT)
Dept: OUTPATIENT SERVICES | Facility: HOSPITAL | Age: 27
LOS: 1 days | End: 2022-02-04

## 2022-02-04 LAB
ALBUMIN SERPL ELPH-MCNC: 4.6 G/DL — SIGNIFICANT CHANGE UP (ref 3.3–5)
ALP SERPL-CCNC: 67 U/L — SIGNIFICANT CHANGE UP (ref 40–120)
ALT FLD-CCNC: 15 U/L — SIGNIFICANT CHANGE UP (ref 4–33)
ANION GAP SERPL CALC-SCNC: 13 MMOL/L — SIGNIFICANT CHANGE UP (ref 7–14)
AST SERPL-CCNC: 17 U/L — SIGNIFICANT CHANGE UP (ref 4–32)
BASOPHILS # BLD AUTO: 0.04 K/UL — SIGNIFICANT CHANGE UP (ref 0–0.2)
BASOPHILS NFR BLD AUTO: 0.4 % — SIGNIFICANT CHANGE UP (ref 0–2)
BILIRUB SERPL-MCNC: 0.3 MG/DL — SIGNIFICANT CHANGE UP (ref 0.2–1.2)
BUN SERPL-MCNC: 11 MG/DL — SIGNIFICANT CHANGE UP (ref 7–23)
CALCIUM SERPL-MCNC: 9.5 MG/DL — SIGNIFICANT CHANGE UP (ref 8.4–10.5)
CHLORIDE SERPL-SCNC: 99 MMOL/L — SIGNIFICANT CHANGE UP (ref 98–107)
CK SERPL-CCNC: 57 U/L — SIGNIFICANT CHANGE UP (ref 25–170)
CO2 SERPL-SCNC: 25 MMOL/L — SIGNIFICANT CHANGE UP (ref 22–31)
CREAT SERPL-MCNC: 0.58 MG/DL — SIGNIFICANT CHANGE UP (ref 0.5–1.3)
EOSINOPHIL # BLD AUTO: 1.61 K/UL — HIGH (ref 0–0.5)
EOSINOPHIL NFR BLD AUTO: 16.4 % — HIGH (ref 0–6)
GLUCOSE SERPL-MCNC: 280 MG/DL — HIGH (ref 70–99)
HCG SERPL-ACNC: <5 MIU/ML — SIGNIFICANT CHANGE UP
HCT VFR BLD CALC: 43.8 % — SIGNIFICANT CHANGE UP (ref 34.5–45)
HGB BLD-MCNC: 14.3 G/DL — SIGNIFICANT CHANGE UP (ref 11.5–15.5)
HIV 1+2 AB+HIV1 P24 AG SERPL QL IA: SIGNIFICANT CHANGE UP
IANC: 5.07 K/UL — SIGNIFICANT CHANGE UP (ref 1.5–8.5)
IMM GRANULOCYTES NFR BLD AUTO: 0.6 % — SIGNIFICANT CHANGE UP (ref 0–1.5)
LYMPHOCYTES # BLD AUTO: 2.41 K/UL — SIGNIFICANT CHANGE UP (ref 1–3.3)
LYMPHOCYTES # BLD AUTO: 24.5 % — SIGNIFICANT CHANGE UP (ref 13–44)
MCHC RBC-ENTMCNC: 28.5 PG — SIGNIFICANT CHANGE UP (ref 27–34)
MCHC RBC-ENTMCNC: 32.6 GM/DL — SIGNIFICANT CHANGE UP (ref 32–36)
MCV RBC AUTO: 87.4 FL — SIGNIFICANT CHANGE UP (ref 80–100)
MONOCYTES # BLD AUTO: 0.64 K/UL — SIGNIFICANT CHANGE UP (ref 0–0.9)
MONOCYTES NFR BLD AUTO: 6.5 % — SIGNIFICANT CHANGE UP (ref 2–14)
NEUTROPHILS # BLD AUTO: 5.07 K/UL — SIGNIFICANT CHANGE UP (ref 1.8–7.4)
NEUTROPHILS NFR BLD AUTO: 51.6 % — SIGNIFICANT CHANGE UP (ref 43–77)
NRBC # BLD: 0 /100 WBCS — SIGNIFICANT CHANGE UP
NRBC # FLD: 0 K/UL — SIGNIFICANT CHANGE UP
PLATELET # BLD AUTO: 429 K/UL — HIGH (ref 150–400)
POTASSIUM SERPL-MCNC: 4.3 MMOL/L — SIGNIFICANT CHANGE UP (ref 3.5–5.3)
POTASSIUM SERPL-SCNC: 4.3 MMOL/L — SIGNIFICANT CHANGE UP (ref 3.5–5.3)
PROT SERPL-MCNC: 7.7 G/DL — SIGNIFICANT CHANGE UP (ref 6–8.3)
RBC # BLD: 5.01 M/UL — SIGNIFICANT CHANGE UP (ref 3.8–5.2)
RBC # FLD: 13.7 % — SIGNIFICANT CHANGE UP (ref 10.3–14.5)
SODIUM SERPL-SCNC: 137 MMOL/L — SIGNIFICANT CHANGE UP (ref 135–145)
WBC # BLD: 9.83 K/UL — SIGNIFICANT CHANGE UP (ref 3.8–10.5)
WBC # FLD AUTO: 9.83 K/UL — SIGNIFICANT CHANGE UP (ref 3.8–10.5)

## 2022-02-05 LAB
HBV CORE AB SER-ACNC: SIGNIFICANT CHANGE UP
HBV CORE IGM SER-ACNC: SIGNIFICANT CHANGE UP
HBV SURFACE AB SER-ACNC: REACTIVE
HBV SURFACE AG SER-ACNC: SIGNIFICANT CHANGE UP
HCV RNA SPEC NAA+PROBE-LOG IU: SIGNIFICANT CHANGE UP
HCV RNA SPEC NAA+PROBE-LOG IU: SIGNIFICANT CHANGE UP LOGIU/ML

## 2022-02-11 ENCOUNTER — APPOINTMENT (OUTPATIENT)
Dept: INTERNAL MEDICINE | Facility: CLINIC | Age: 27
End: 2022-02-11

## 2022-02-11 ENCOUNTER — RESULT REVIEW (OUTPATIENT)
Age: 27
End: 2022-02-11

## 2022-02-11 ENCOUNTER — OUTPATIENT (OUTPATIENT)
Dept: OUTPATIENT SERVICES | Facility: HOSPITAL | Age: 27
LOS: 1 days | End: 2022-02-11

## 2022-02-14 ENCOUNTER — RX RENEWAL (OUTPATIENT)
Age: 27
End: 2022-02-14

## 2022-02-14 DIAGNOSIS — E10.9 TYPE 1 DIABETES MELLITUS WITHOUT COMPLICATIONS: ICD-10-CM

## 2022-02-16 DIAGNOSIS — L20.9 ATOPIC DERMATITIS, UNSPECIFIED: ICD-10-CM

## 2022-02-16 DIAGNOSIS — B07.9 VIRAL WART, UNSPECIFIED: ICD-10-CM

## 2022-02-16 LAB
GAMMA INTERFERON BACKGROUND BLD IA-ACNC: 0 IU/ML — SIGNIFICANT CHANGE UP
M TB IFN-G BLD-IMP: NEGATIVE — SIGNIFICANT CHANGE UP
M TB IFN-G CD4+ BCKGRND COR BLD-ACNC: 0 IU/ML — SIGNIFICANT CHANGE UP
M TB IFN-G CD4+CD8+ BCKGRND COR BLD-ACNC: 0 IU/ML — SIGNIFICANT CHANGE UP
QUANT TB PLUS MITOGEN MINUS NIL: 10 IU/ML — SIGNIFICANT CHANGE UP

## 2022-02-28 ENCOUNTER — RX RENEWAL (OUTPATIENT)
Age: 27
End: 2022-02-28

## 2022-03-18 ENCOUNTER — APPOINTMENT (OUTPATIENT)
Dept: DERMATOLOGY | Facility: CLINIC | Age: 27
End: 2022-03-18

## 2022-04-26 ENCOUNTER — RX RENEWAL (OUTPATIENT)
Age: 27
End: 2022-04-26

## 2022-04-26 ENCOUNTER — OUTPATIENT (OUTPATIENT)
Dept: OUTPATIENT SERVICES | Facility: HOSPITAL | Age: 27
LOS: 1 days | End: 2022-04-26

## 2022-04-26 ENCOUNTER — APPOINTMENT (OUTPATIENT)
Dept: ENDOCRINOLOGY | Facility: CLINIC | Age: 27
End: 2022-04-26
Payer: MEDICAID

## 2022-04-26 VITALS
TEMPERATURE: 97.3 F | SYSTOLIC BLOOD PRESSURE: 122 MMHG | HEIGHT: 65 IN | DIASTOLIC BLOOD PRESSURE: 74 MMHG | WEIGHT: 135 LBS | OXYGEN SATURATION: 99 % | BODY MASS INDEX: 22.49 KG/M2 | HEART RATE: 92 BPM

## 2022-04-26 LAB — HBA1C MFR BLD HPLC: 7.5

## 2022-04-26 PROCEDURE — 99214 OFFICE O/P EST MOD 30 MIN: CPT | Mod: GC

## 2022-04-26 RX ORDER — CROMOLYN SODIUM 40 MG/ML
4 SOLUTION/ DROPS OPHTHALMIC 4 TIMES DAILY
Qty: 10 | Refills: 2 | Status: ACTIVE | COMMUNITY
Start: 2021-07-22 | End: 1900-01-01

## 2022-04-29 DIAGNOSIS — E10.9 TYPE 1 DIABETES MELLITUS WITHOUT COMPLICATIONS: ICD-10-CM

## 2022-04-29 NOTE — PHYSICAL EXAM
[Alert] : alert [Well Nourished] : well nourished [Healthy Appearance] : healthy appearance [EOMI] : extra ocular movement intact [Normal Hearing] : hearing was normal [No Neck Mass] : no neck mass was observed [No Thyroid Nodules] : no palpable thyroid nodules [No Respiratory Distress] : no respiratory distress [No Accessory Muscle Use] : no accessory muscle use [Clear to Auscultation] : lungs were clear to auscultation bilaterally [Normal S1, S2] : normal S1 and S2 [Normal Rate] : heart rate was normal [No Edema] : no peripheral edema [Not Tender] : non-tender [Soft] : abdomen soft [No Tremors] : no tremors [No Sensory Deficits] : the sensory exam was normal to light touch and pinprick [de-identified] : n [de-identified] : generalized atopic dermatitis

## 2022-04-29 NOTE — ASSESSMENT
[FreeTextEntry1] : 27 yo woman with T1DM and Fragile X Syndrome\par \par  T1DM- controlled \par - POCT A1c 7.5%\par - 5/2021 microalbumin neg\par - Continue DEXCOM CGM (father uses intermittently and checks manual FS as well)\par - Continue Levemir 24units qhs; discussed switching to another basal insulin that is longer acting than Levemir given hyperglycemia around bedtime with no other changes to patient's current routine in terms of diet/medications/activity. However, father reluctant to make this change as it has only been around 10 days since this trend has been noticed & patient has been on Levemir for around 5 years now. Father reports he is comfortable titrating/making adjustments to insulin doses based on home blood glucose monitoring, prefers to continue with same regimen at this time. \par - Discussed hypoglycemia symptoms & management w/ patient's father \par - Continue CR of 1:5 at breakfast and CR of 1:4 at dinnertime for now (Humalog 10-12units before breakfast & Humalog 25units before dinner). Father reports he will monitor patient and make changes as tolerated. \par - has glucagon & ketone strips at home\par - Up to date with ophtho\par \par HLD\par - 5/2021 LDL 70\par - Will continue to monitor annually off treatment \par - repeat lipid panel w/ next blood draw, can be done w/ PCP or endocrine\par \par HCM \par 12/2019 TSH 0.79, FT4 1.31\par 12/2019 VIt D 34.8\par \par RTC in 6 months\par \par Discussed with Dr Gee \par \par Radha Dewitt,  \par Endocrine Fellow\par

## 2022-04-29 NOTE — REVIEW OF SYSTEMS
[All other systems negative] : All other systems negative [Fatigue] : no fatigue [Decreased Appetite] : appetite not decreased [Recent Weight Gain (___ Lbs)] : no recent weight gain [Recent Weight Loss (___ Lbs)] : no recent weight loss [Blurred Vision] : no blurred vision [Eyes Itch] : no itch [Redness] : no redness  [Dysphagia] : no dysphagia [Chest Pain] : no chest pain [Palpitations] : no palpitations [Shortness Of Breath] : no shortness of breath [Nausea] : no nausea [Constipation] : no constipation [Vomiting] : no vomiting [Diarrhea] : no diarrhea [Dizziness] : no dizziness [Pain/Numbness of Digits] : no pain/numbness of digits [Polydipsia] : no polydipsia [Cold Intolerance] : no cold intolerance [Heat Intolerance] : no heat intolerance

## 2022-04-29 NOTE — HISTORY OF PRESENT ILLNESS
[FreeTextEntry1] : 26 year old female here for DM 1 and fragile X syndrome. \par Accompanied by father. \par \par DM 1 diagnosed at 6 years old. 5/2021 A1c 7.7%. A1c in office today 7.5%. POCT in December 2021 7.2%. \par Father reports that over the last 10 days or so, bedtime glucose levels have been running higher for unclear reasons. BG levels in 200s to 250s, once in the 300s prior to bedtime. Parents have been adjusting preDinner Humalog - currently taking Humalog 25 units prior to dinner. Takes Levemir 24 units at bedtime and Humalog 10-12 units before breakfast. No hypoglycemic episodes and sugars are well controlled in the morning, fasting, 80s-140s. Blood glucose levels are also stable during lunch time & relatively well controlled prior to dinner. \par No changes to patient's diet, other medications, activity, fever, URI sx, dysuria. \par using dexcom & fingersticks for blood glucose monitoring \par \par Carb Ratios (father reports however that they have not been needing to use carb ratios as patient has been eating the exact same meals every day; only using carb ratios if patient is eating something different). \par \par 1:5 ac breakfast (typically 60-70grams of carbs)\par Does not give pre lunch insulin as patient goes to day program and only has small snack 25-30grams of carbs \par 1:4 ac dinner (typically 100-110 grams of carbs) \par Patient always has a bedtime snack, typically 20-30 grams of carbs \par Francine factor 1:40 with BGT \par \par Discussed obtaining medical alert bracelet with father but reports that patient does not want. \par \par Diet: \par B: waffle and cereal and fruits (apple orange strawberry) (60 to 70 grams of carbs) \par L: eats 25-30gg snack (fruit cup/ protein bar/yogurt) as she goes to day program \par D: hamburger and french fries or taco salad, chicken nuggets and fries + fruit  (100-110grams of carbs) \par qhs snack: jello or fruits (20-30 grams of carbs) \par snacks on veggies & peanuts \par \par Last dilated eye exam within this past year per father, no DM retinopathy \par No nephropathy, no neuropathy\par No known macrovascular complications \par \par Currently having regular menses.

## 2022-04-29 NOTE — END OF VISIT
[] : Fellow [FreeTextEntry3] : \par DM1, generally well controlled with recent elevation in glucoses limited to 2-4 hours after dinner. No hx suggestive of infection/new medication/change in diet. Trial of longer lasting basal insulin (lantus, toujeo, tresiba) discussed but deferred for now by patient's father. Pre dinner bolus dose increased.

## 2022-05-03 ENCOUNTER — NON-APPOINTMENT (OUTPATIENT)
Age: 27
End: 2022-05-03

## 2022-05-06 ENCOUNTER — OUTPATIENT (OUTPATIENT)
Dept: OUTPATIENT SERVICES | Facility: HOSPITAL | Age: 27
LOS: 1 days | End: 2022-05-06

## 2022-05-06 ENCOUNTER — APPOINTMENT (OUTPATIENT)
Dept: DERMATOLOGY | Facility: CLINIC | Age: 27
End: 2022-05-06
Payer: MEDICAID

## 2022-05-06 VITALS
OXYGEN SATURATION: 99 % | BODY MASS INDEX: 22.49 KG/M2 | TEMPERATURE: 98 F | DIASTOLIC BLOOD PRESSURE: 70 MMHG | HEIGHT: 65 IN | RESPIRATION RATE: 16 BRPM | HEART RATE: 89 BPM | SYSTOLIC BLOOD PRESSURE: 126 MMHG | WEIGHT: 135 LBS

## 2022-05-06 DIAGNOSIS — L20.9 ATOPIC DERMATITIS, UNSPECIFIED: ICD-10-CM

## 2022-05-06 PROCEDURE — 99214 OFFICE O/P EST MOD 30 MIN: CPT

## 2022-05-31 ENCOUNTER — RX RENEWAL (OUTPATIENT)
Age: 27
End: 2022-05-31

## 2022-05-31 RX ORDER — HYDROXYZINE HYDROCHLORIDE 25 MG/1
25 TABLET ORAL TWICE DAILY
Qty: 180 | Refills: 2 | Status: ACTIVE | COMMUNITY
Start: 2021-12-06 | End: 1900-01-01

## 2022-06-13 ENCOUNTER — RX RENEWAL (OUTPATIENT)
Age: 27
End: 2022-06-13

## 2022-07-20 DIAGNOSIS — Z91.89 OTHER SPECIFIED PERSONAL RISK FACTORS, NOT ELSEWHERE CLASSIFIED: ICD-10-CM

## 2022-07-20 RX ORDER — ZINC OXIDE 200 MG/G
20 OINTMENT TOPICAL
Qty: 120 | Refills: 2 | Status: ACTIVE | COMMUNITY
Start: 2022-07-20 | End: 1900-01-01

## 2022-08-09 ENCOUNTER — NON-APPOINTMENT (OUTPATIENT)
Age: 27
End: 2022-08-09

## 2022-08-09 ENCOUNTER — TRANSCRIPTION ENCOUNTER (OUTPATIENT)
Age: 27
End: 2022-08-09

## 2022-10-25 ENCOUNTER — OUTPATIENT (OUTPATIENT)
Dept: OUTPATIENT SERVICES | Facility: HOSPITAL | Age: 27
LOS: 1 days | End: 2022-10-25

## 2022-10-25 ENCOUNTER — APPOINTMENT (OUTPATIENT)
Dept: ENDOCRINOLOGY | Facility: CLINIC | Age: 27
End: 2022-10-25

## 2022-10-25 VITALS
HEIGHT: 65 IN | HEART RATE: 84 BPM | BODY MASS INDEX: 20.66 KG/M2 | DIASTOLIC BLOOD PRESSURE: 78 MMHG | SYSTOLIC BLOOD PRESSURE: 122 MMHG | WEIGHT: 124 LBS | RESPIRATION RATE: 16 BRPM | OXYGEN SATURATION: 98 %

## 2022-10-25 LAB — HBA1C MFR BLD HPLC: 7

## 2022-10-25 PROCEDURE — 99214 OFFICE O/P EST MOD 30 MIN: CPT | Mod: GC

## 2022-10-26 LAB
25(OH)D3 SERPL-MCNC: 46.1 NG/ML
ANION GAP SERPL CALC-SCNC: 13 MMOL/L
BUN SERPL-MCNC: 13 MG/DL
CALCIUM SERPL-MCNC: 9.6 MG/DL
CHLORIDE SERPL-SCNC: 101 MMOL/L
CHOLEST SERPL-MCNC: 143 MG/DL
CO2 SERPL-SCNC: 28 MMOL/L
CREAT SERPL-MCNC: 0.64 MG/DL
EGFR: 125 ML/MIN/1.73M2
GLUCOSE SERPL-MCNC: 188 MG/DL
HDLC SERPL-MCNC: 45 MG/DL
LDLC SERPL CALC-MCNC: 80 MG/DL
NONHDLC SERPL-MCNC: 98 MG/DL
POTASSIUM SERPL-SCNC: 4.1 MMOL/L
SODIUM SERPL-SCNC: 141 MMOL/L
TRIGL SERPL-MCNC: 92 MG/DL
TSH SERPL-ACNC: 0.75 UIU/ML

## 2022-10-27 NOTE — ASSESSMENT
[FreeTextEntry1] : 26yo F with T1DM and Fragile X Syndrome\par \par  T1DM- controlled \par - POCT A1c 7.0%  today \par - 5/2021 microalbumin neg - repeat today \par - Continue DEXCOM CGM (father uses intermittently and checks manual FS as well)\par - Decrease to Tresiba 10 units SC QHS \par - Decrease to Humalog 8-10 units (instead of 10-12 units) before breakfast, continue Humalog 12-15 units before dinner \par -  Father reports he is comfortable titrating/making adjustments to insulin doses based on home blood glucose monitoring, prefers to continue with same regimen at this time and make adjustments PRN\par - Discussed hypoglycemia symptoms & management w/ patient's father & mother \par - Reviewed BG goals- fasting 80-130mg/dl, premeal < 140mg/dL, 2 hours post prandial < 180mg/dL \par - Glucagon pen refilled - may require PA, mother says she will let us know if so\par - Has ketone strips at home\par - Up to date with ophtho, seen within past year per parents - annual optho visit encouraged, more frequently if any issues \par - check BMP, Lipids, Alb/Cr, TSHw/ reflex & vitamin D 25 Oh today \par \par HLD\par - 5/2021 LDL 70\par - Will continue to monitor annually off treatment \par - repeat lipid panel today \par - LDL goal < 70\par \par HTN \par - no nephropathy \par - repeat alb/cr today \par - BP controlled, not on antihypertensives \par - BP goal < 130/80 \par \par HCM \par 12/2019 TSH 0.79, FT4 1.31\par 12/2019 VIt D 34.8 \par \par RTC in 3 months\par Contact clinic if persistent hyperglycemia or hypoglycemia \par \par Discussed with Dr Ferrell \par \par Radha Dewitt DO \par Endocrine Fellow\par

## 2022-10-27 NOTE — HISTORY OF PRESENT ILLNESS
[FreeTextEntry1] : 27 year old female here for DM 1 and fragile X syndrome. \par Accompanied by father and mother. \par \par DM 1 diagnosed at 6 years old. \par POCT A1c checked in office today 7.0%\par 2021 A1c 7.7% > 2021 7.2% > 2022 7.5%. \par \par tresiba 11, humalog 10-12 before breakfast \par humalog 12-15 before dinner\par Does not take insulin before lunch as she generally has a very low carb snack during lunch time. \par \par Parents report that insulin requirements have dropped significantly since her last visit. Has lost about 10lbs but they feel that patient sugars fluctuate at times due to anxiety/stress. Suffers from OCD as well per mom & following with psych for this. AM fasting fs bg levels widely variable, 80s-260s, no real pattern. \par Has been having low BG levels 50s-60s-70s sometimes around lunch time (12pm) and also around 3pm-4pm. \par Father says that they have been titrating down insulin doses due to this. Reports that parents & caretakers are always watching patient, uses dexcom while at day program & FS at home for the most part. Keeps a close look out for hypoglycemia & corrects with candy. Has glucagon at home but possibly . Has not needed it recently, just correcting with food/candy instead. \par BG levels around dinner time & bedtime relatively well controlled, acceptable. \par No longer having BG levels in 250s-300s as last visit. \par No changes to patient's diet, other medications, activity, fever, URI sx, dysuria. Feeling well otherwise. Eczema stable- no recent steroids, not using dupixent- did not help with skin per mother, did improve hair thickness however she says. \par Using dexcom & fingersticks for blood glucose monitoring \par \par Carb Ratios (father reports however that they have not been needing to use carb ratios as patient has been eating the exact same meals every day; only using carb ratios if patient is eating something different). \par 1:5 ac breakfast (typically 60-70grams of carbs)\par Does not give pre lunch insulin as patient goes to day program and only has small snack 25-30grams of carbs \par 1:4 ac dinner (typically 100-110 grams of carbs) \par Patient always has a bedtime snack, typically 20-30 grams of carbs \par Francine factor 1:40 with BGT \par \par Discussed obtaining medical alert reanna with father but reports that patient does not want. \par \par Diet: \par B: waffle and cereal and fruits (apple orange strawberry) (60 to 70 grams of carbs) \par L: eats a snack - low carb usually, if carbs no more than 25g & they do not cover for this (fruit cup/ protein bar/yogurt/peanuts/peanut butter) as she goes to day program \par D: hamburger and french fries or taco salad, chicken nuggets and fries + fruit  (100-110grams of carbs) \par qhs snack: jello or fruits (20-30 grams of carbs) \par snacks on veggies & peanuts \par \par Last dilated eye exam within this past year, no DM retinopathy \par No nephropathy, no neuropathy\par No known macrovascular complications \par \par Currently having regular menses.

## 2022-10-27 NOTE — REVIEW OF SYSTEMS
[All other systems negative] : All other systems negative [Dry Skin] : dry skin [Fatigue] : no fatigue [Decreased Appetite] : appetite not decreased [Recent Weight Gain (___ Lbs)] : no recent weight gain [Recent Weight Loss (___ Lbs)] : no recent weight loss [Blurred Vision] : no blurred vision [Eyes Itch] : no itch [Redness] : no redness  [Dysphagia] : no dysphagia [Chest Pain] : no chest pain [Palpitations] : no palpitations [Shortness Of Breath] : no shortness of breath [Nausea] : no nausea [Constipation] : no constipation [Vomiting] : no vomiting [Diarrhea] : no diarrhea [Dizziness] : no dizziness [Pain/Numbness of Digits] : no pain/numbness of digits [Polydipsia] : no polydipsia [Cold Intolerance] : no cold intolerance [Heat Intolerance] : no heat intolerance [de-identified] : severe eczema

## 2022-10-27 NOTE — END OF VISIT
[] : Fellow [FreeTextEntry3] : 27 year old woman with T1DM, well controlled and Fragile X syndrome.  Pt with fasting hypoglycemia and occasionally after breakfast.  Recomend -\par  Decrease  Tresiba to 10 units  QHS \par - Decrease Humalog to 8-10 units (instead of 10-12 units) before breakfast, continue Humalog 12-15 units before dinner \par Blood pressure controlled, LDL 70.

## 2022-10-27 NOTE — PHYSICAL EXAM
[Alert] : alert [Well Nourished] : well nourished [Healthy Appearance] : healthy appearance [EOMI] : extra ocular movement intact [Normal Hearing] : hearing was normal [No Neck Mass] : no neck mass was observed [No Thyroid Nodules] : no palpable thyroid nodules [No Respiratory Distress] : no respiratory distress [No Accessory Muscle Use] : no accessory muscle use [Clear to Auscultation] : lungs were clear to auscultation bilaterally [Normal S1, S2] : normal S1 and S2 [No Edema] : no peripheral edema [Normal Rate] : heart rate was normal [Not Tender] : non-tender [Soft] : abdomen soft [No Sensory Deficits] : the sensory exam was normal to light touch and pinprick [No Tremors] : no tremors [No Stigmata of Cushings Syndrome] : no stigmata of Cushings Syndrome [Normal Gait] : normal gait [de-identified] : generalized atopic dermatitis/eczema, erythematous/excoriated skin noted on b/l UE & LE [de-identified] : calm, cooperative

## 2022-10-28 DIAGNOSIS — E10.9 TYPE 1 DIABETES MELLITUS WITHOUT COMPLICATIONS: ICD-10-CM

## 2022-10-28 DIAGNOSIS — E78.5 HYPERLIPIDEMIA, UNSPECIFIED: ICD-10-CM

## 2022-10-31 ENCOUNTER — NON-APPOINTMENT (OUTPATIENT)
Age: 27
End: 2022-10-31

## 2022-11-03 RX ORDER — DASIGLUCAGON 0.6 MG/.6ML
0.6 INJECTION, SOLUTION SUBCUTANEOUS
Qty: 1 | Refills: 3 | Status: DISCONTINUED | COMMUNITY
Start: 2022-10-25 | End: 2022-11-03

## 2022-12-05 ENCOUNTER — OUTPATIENT (OUTPATIENT)
Dept: OUTPATIENT SERVICES | Facility: HOSPITAL | Age: 27
LOS: 1 days | End: 2022-12-05

## 2022-12-05 ENCOUNTER — APPOINTMENT (OUTPATIENT)
Dept: INTERNAL MEDICINE | Facility: CLINIC | Age: 27
End: 2022-12-05

## 2022-12-05 VITALS
DIASTOLIC BLOOD PRESSURE: 78 MMHG | TEMPERATURE: 98.3 F | WEIGHT: 128 LBS | HEART RATE: 80 BPM | SYSTOLIC BLOOD PRESSURE: 124 MMHG | HEIGHT: 65 IN | OXYGEN SATURATION: 99 % | BODY MASS INDEX: 21.33 KG/M2 | RESPIRATION RATE: 16 BRPM

## 2022-12-05 DIAGNOSIS — Q99.2 FRAGILE X CHROMOSOME: ICD-10-CM

## 2022-12-05 DIAGNOSIS — E10.9 TYPE 1 DIABETES MELLITUS WITHOUT COMPLICATIONS: ICD-10-CM

## 2022-12-05 DIAGNOSIS — Z00.00 ENCOUNTER FOR GENERAL ADULT MEDICAL EXAMINATION WITHOUT ABNORMAL FINDINGS: ICD-10-CM

## 2022-12-05 DIAGNOSIS — R62.50 UNSPECIFIED LACK OF EXPECTED NORMAL PHYSIOLOGICAL DEVELOPMENT IN CHILDHOOD: ICD-10-CM

## 2022-12-05 DIAGNOSIS — R21 RASH AND OTHER NONSPECIFIC SKIN ERUPTION: ICD-10-CM

## 2022-12-05 DIAGNOSIS — L30.9 DERMATITIS, UNSPECIFIED: ICD-10-CM

## 2022-12-05 DIAGNOSIS — Z00.00 ENCOUNTER FOR GENERAL ADULT MEDICAL EXAMINATION W/OUT ABNORMAL FINDINGS: ICD-10-CM

## 2022-12-05 PROCEDURE — 99395 PREV VISIT EST AGE 18-39: CPT

## 2022-12-05 RX ORDER — BLOOD-GLUCOSE METER
W/DEVICE KIT MISCELLANEOUS
Qty: 1 | Refills: 4 | Status: COMPLETED | COMMUNITY
Start: 2018-12-24 | End: 2022-12-05

## 2022-12-05 RX ORDER — FLUOROURACIL 50 MG/G
5 CREAM TOPICAL
Qty: 1 | Refills: 0 | Status: COMPLETED | COMMUNITY
Start: 2019-06-27 | End: 2022-12-05

## 2022-12-05 RX ORDER — IMIQUIMOD 50 MG/G
5 CREAM TOPICAL
Qty: 36 | Refills: 1 | Status: COMPLETED | COMMUNITY
Start: 2020-10-05 | End: 2022-12-05

## 2022-12-05 RX ORDER — TRIAMCINOLONE ACETONIDE 1 MG/G
0.1 CREAM TOPICAL TWICE DAILY
Qty: 454 | Refills: 1 | Status: COMPLETED | COMMUNITY
Start: 2019-07-01 | End: 2022-12-05

## 2022-12-05 RX ORDER — GLUCAGON INJECTION, SOLUTION 1 MG/.2ML
1 INJECTION, SOLUTION SUBCUTANEOUS
Qty: 1 | Refills: 3 | Status: COMPLETED | COMMUNITY
Start: 2022-10-31 | End: 2022-12-05

## 2022-12-05 RX ORDER — CLOBETASOL PROPIONATE 0.5 MG/G
0.05 OINTMENT TOPICAL
Qty: 1 | Refills: 0 | Status: COMPLETED | COMMUNITY
Start: 2017-09-26 | End: 2022-12-05

## 2022-12-05 RX ORDER — PSEUDOEPHEDRINE HCL 30 MG/1
30 TABLET, FILM COATED ORAL EVERY 6 HOURS
Qty: 40 | Refills: 0 | Status: ACTIVE | COMMUNITY
Start: 2018-09-06 | End: 1900-01-01

## 2022-12-05 RX ORDER — DUPILUMAB 300 MG/2ML
300 INJECTION, SOLUTION SUBCUTANEOUS
Qty: 1 | Refills: 0 | Status: COMPLETED | COMMUNITY
Start: 2019-06-27 | End: 2022-12-05

## 2022-12-05 RX ORDER — BLOOD-GLUCOSE METER
W/DEVICE KIT MISCELLANEOUS
Qty: 1 | Refills: 0 | Status: COMPLETED | COMMUNITY
Start: 2018-12-24 | End: 2022-12-05

## 2022-12-05 RX ORDER — MUPIROCIN 20 MG/G
2 OINTMENT TOPICAL
Qty: 1 | Refills: 3 | Status: COMPLETED | COMMUNITY
Start: 2019-06-27 | End: 2022-12-05

## 2022-12-05 RX ORDER — ALCOHOL ANTISEPTIC PADS
70 PADS, MEDICATED (EA) TOPICAL
Qty: 100 | Refills: 1 | Status: COMPLETED | COMMUNITY
Start: 2020-03-02 | End: 2022-12-05

## 2022-12-05 RX ORDER — IBUPROFEN 600 MG/1
600 TABLET, FILM COATED ORAL 3 TIMES DAILY
Qty: 15 | Refills: 3 | Status: COMPLETED | COMMUNITY
Start: 2017-06-22 | End: 2022-12-05

## 2022-12-05 RX ORDER — RUXOLITINIB 15 MG/G
1.5 CREAM TOPICAL
Qty: 1 | Refills: 3 | Status: COMPLETED | COMMUNITY
Start: 2021-10-19 | End: 2022-12-05

## 2022-12-05 RX ORDER — BLOOD SUGAR DIAGNOSTIC
STRIP MISCELLANEOUS
Qty: 1 | Refills: 2 | Status: COMPLETED | COMMUNITY
Start: 2017-06-15 | End: 2022-12-05

## 2022-12-05 RX ORDER — NAPROXEN SODIUM 550 MG/1
550 TABLET ORAL
Qty: 20 | Refills: 3 | Status: COMPLETED | COMMUNITY
Start: 2017-06-22 | End: 2022-12-05

## 2022-12-05 RX ORDER — LANCETS 33 GAUGE
EACH MISCELLANEOUS
Qty: 1 | Refills: 5 | Status: COMPLETED | COMMUNITY
Start: 2017-06-29 | End: 2022-12-05

## 2022-12-05 RX ORDER — BLOOD-GLUCOSE METER
W/DEVICE KIT MISCELLANEOUS
Qty: 1 | Refills: 0 | Status: COMPLETED | COMMUNITY
Start: 2018-12-21 | End: 2022-12-05

## 2022-12-05 RX ORDER — ACYCLOVIR 50 MG/G
5 OINTMENT TOPICAL 4 TIMES DAILY
Qty: 1 | Refills: 3 | Status: COMPLETED | COMMUNITY
Start: 2021-05-07 | End: 2022-12-05

## 2022-12-05 RX ORDER — UPADACITINIB 15 MG/1
15 TABLET, EXTENDED RELEASE ORAL
Qty: 30 | Refills: 0 | Status: COMPLETED | COMMUNITY
Start: 2022-02-18 | End: 2022-12-05

## 2022-12-05 RX ORDER — FLUOCINONIDE 0.05 MG/G
0.05 OINTMENT TOPICAL TWICE DAILY
Qty: 1 | Refills: 2 | Status: COMPLETED | COMMUNITY
Start: 2019-09-19 | End: 2022-12-05

## 2022-12-05 RX ORDER — TACROLIMUS 1 MG/G
0.1 OINTMENT TOPICAL TWICE DAILY
Qty: 1 | Refills: 2 | Status: COMPLETED | COMMUNITY
Start: 2019-07-02 | End: 2022-12-05

## 2022-12-05 RX ORDER — BLOOD-GLUCOSE SENSOR
EACH MISCELLANEOUS
Qty: 1 | Refills: 5 | Status: COMPLETED | COMMUNITY
Start: 2019-01-23 | End: 2022-12-05

## 2022-12-05 RX ORDER — BLOOD-GLUCOSE METER
W/DEVICE EACH MISCELLANEOUS
Qty: 1 | Refills: 0 | Status: COMPLETED | COMMUNITY
Start: 2017-06-15 | End: 2022-12-05

## 2022-12-05 RX ORDER — HUMIDIFIER
EACH MISCELLANEOUS
Qty: 1 | Refills: 0 | Status: COMPLETED | COMMUNITY
Start: 2019-02-14 | End: 2022-12-05

## 2022-12-05 RX ORDER — INSULIN GLARGINE 100 [IU]/ML
100 INJECTION, SOLUTION SUBCUTANEOUS
Qty: 5 | Refills: 0 | Status: COMPLETED | COMMUNITY
Start: 2022-05-02 | End: 2022-12-05

## 2022-12-05 RX ORDER — HUMIDIFIER
EACH MISCELLANEOUS
Qty: 1 | Refills: 0 | Status: COMPLETED | COMMUNITY
Start: 2019-02-04 | End: 2022-12-05

## 2022-12-05 RX ORDER — BETAMETHASONE DIPROPIONATE 0.5 MG/G
0.05 OINTMENT, AUGMENTED TOPICAL TWICE DAILY
Qty: 3 | Refills: 2 | Status: COMPLETED | COMMUNITY
Start: 2019-06-28 | End: 2022-12-05

## 2022-12-05 RX ORDER — BLOOD-GLUCOSE METER
KIT MISCELLANEOUS
Qty: 2 | Refills: 3 | Status: COMPLETED | COMMUNITY
Start: 2018-12-27 | End: 2022-12-05

## 2022-12-05 RX ORDER — BLOOD-GLUCOSE METER
W/DEVICE KIT MISCELLANEOUS
Qty: 1 | Refills: 5 | Status: COMPLETED | COMMUNITY
Start: 2018-12-24 | End: 2022-12-05

## 2022-12-05 NOTE — REVIEW OF SYSTEMS
[Abdominal Pain] : abdominal pain [Fever] : no fever [Chills] : no chills [Recent Change In Weight] : ~T no recent weight change [FreeTextEntry1] : ROS limited

## 2022-12-05 NOTE — ASSESSMENT
[FreeTextEntry1] : completed form, physician's order for personal care/consumer directed personal assistance services

## 2022-12-05 NOTE — PHYSICAL EXAM
[No Acute Distress] : no acute distress [Well Nourished] : well nourished [Well Developed] : well developed [Normal Sclera/Conjunctiva] : normal sclera/conjunctiva [Normal Outer Ear/Nose] : the outer ears and nose were normal in appearance [No Respiratory Distress] : no respiratory distress  [No Accessory Muscle Use] : no accessory muscle use [Clear to Auscultation] : lungs were clear to auscultation bilaterally [Normal Rate] : normal rate  [Regular Rhythm] : with a regular rhythm [Normal S1, S2] : normal S1 and S2 [No Murmur] : no murmur heard [Soft] : abdomen soft [Non Tender] : non-tender [Non-distended] : non-distended [No HSM] : no HSM [Normal Bowel Sounds] : normal bowel sounds [Normal Posterior Cervical Nodes] : no posterior cervical lymphadenopathy [Normal Anterior Cervical Nodes] : no anterior cervical lymphadenopathy [No CVA Tenderness] : no CVA  tenderness [No Spinal Tenderness] : no spinal tenderness [de-identified] : full range of motion [de-identified] : eczema

## 2022-12-05 NOTE — HISTORY OF PRESENT ILLNESS
[FreeTextEntry1] : follow up [de-identified] : patient is present with her mother and father. \par \par \par 89 with fragile x-syndrome, intellectual disability, anxiety, DM, eczema, and abdominal pain comes for follow up\par \par abdominal pain: continues to have abdominal cramp around her period and bloating after eating. unclear with which food

## 2023-02-03 ENCOUNTER — APPOINTMENT (OUTPATIENT)
Dept: DERMATOLOGY | Facility: CLINIC | Age: 28
End: 2023-02-03

## 2023-02-11 ENCOUNTER — NON-APPOINTMENT (OUTPATIENT)
Age: 28
End: 2023-02-11

## 2023-02-13 ENCOUNTER — NON-APPOINTMENT (OUTPATIENT)
Age: 28
End: 2023-02-13

## 2023-02-27 ENCOUNTER — RX RENEWAL (OUTPATIENT)
Age: 28
End: 2023-02-27

## 2023-03-01 ENCOUNTER — RX RENEWAL (OUTPATIENT)
Age: 28
End: 2023-03-01

## 2023-03-23 ENCOUNTER — APPOINTMENT (OUTPATIENT)
Dept: SURGERY | Facility: CLINIC | Age: 28
End: 2023-03-23
Payer: MEDICAID

## 2023-03-23 VITALS — BODY MASS INDEX: 20.83 KG/M2 | WEIGHT: 125 LBS | HEIGHT: 65 IN

## 2023-03-23 PROCEDURE — 99203 OFFICE O/P NEW LOW 30 MIN: CPT

## 2023-03-23 NOTE — REVIEW OF SYSTEMS
[Negative] : Heme/Lymph [FreeTextEntry7] : Daily BM [de-identified] : Eczema [de-identified] : Developmental delays [de-identified] : OCD [de-identified] : Diabetes

## 2023-03-23 NOTE — ASSESSMENT
[FreeTextEntry1] : 27y.o. F w/ Fragile X syndrome, DM type I, eczema presenting with her mom for a perianal skin tag.

## 2023-03-23 NOTE — PHYSICAL EXAM
[Excoriation] : no perianal excoriation [JVD] : no jugular venous distention  [Alert] : alert [Oriented to Person] : oriented to person [Oriented to Place] : oriented to place [de-identified] : soft, non-distended [de-identified] : perianal skin normal, right posterior skin tag w/ long stalk and bilobar (benign appearing), internal exam deferred [de-identified] : awake, alert, anxious [de-identified] : normocephalic, atraumatic, EOMI, nl conjunctiva [de-identified] : b/l chest rise, EWOB on RA [de-identified] : deferred [de-identified] : normal strength and ambulation [de-identified] : diffuse erythema and plaques consistent with her eczema [de-identified] : anxious (baseline)

## 2023-03-23 NOTE — HISTORY OF PRESENT ILLNESS
[FreeTextEntry1] : Patient is a 26 yo female here with her mom with complaints of an anal skin tag.  She states that it has been there for about 5years and has gradually gotten bigger and bilobar.  She states that there is no pain or bleeding.  She does have a bowel movement daily without straining or issues.

## 2023-04-25 ENCOUNTER — RX RENEWAL (OUTPATIENT)
Age: 28
End: 2023-04-25

## 2023-05-22 ENCOUNTER — RX RENEWAL (OUTPATIENT)
Age: 28
End: 2023-05-22

## 2023-05-23 ENCOUNTER — OUTPATIENT (OUTPATIENT)
Dept: OUTPATIENT SERVICES | Facility: HOSPITAL | Age: 28
LOS: 1 days | End: 2023-05-23

## 2023-05-23 ENCOUNTER — APPOINTMENT (OUTPATIENT)
Dept: ENDOCRINOLOGY | Facility: CLINIC | Age: 28
End: 2023-05-23
Payer: MEDICAID

## 2023-05-23 VITALS
BODY MASS INDEX: 22.16 KG/M2 | DIASTOLIC BLOOD PRESSURE: 76 MMHG | SYSTOLIC BLOOD PRESSURE: 120 MMHG | WEIGHT: 133 LBS | OXYGEN SATURATION: 99 % | HEIGHT: 65 IN | RESPIRATION RATE: 16 BRPM | HEART RATE: 82 BPM

## 2023-05-23 DIAGNOSIS — E10.9 TYPE 1 DIABETES MELLITUS WITHOUT COMPLICATIONS: ICD-10-CM

## 2023-05-23 DIAGNOSIS — E78.5 HYPERLIPIDEMIA, UNSPECIFIED: ICD-10-CM

## 2023-05-23 DIAGNOSIS — Z00.00 ENCOUNTER FOR GENERAL ADULT MEDICAL EXAMINATION W/OUT ABNORMAL FINDINGS: ICD-10-CM

## 2023-05-23 DIAGNOSIS — Z00.00 ENCOUNTER FOR GENERAL ADULT MEDICAL EXAMINATION WITHOUT ABNORMAL FINDINGS: ICD-10-CM

## 2023-05-23 LAB — HBA1C MFR BLD HPLC: 7.3

## 2023-05-23 PROCEDURE — 99214 OFFICE O/P EST MOD 30 MIN: CPT | Mod: GC

## 2023-05-23 NOTE — ASSESSMENT
[FreeTextEntry1] : 26yo F with T1DM and Fragile X Syndrome\par \par  T1DM- controlled \par - POCT A1c 7.3%  today \par - 5/2021 microalbumin neg - repeat today \par - Encouraged using DEXCOM G6 CGM (father uses intermittently and checks manual FS as well)\par - Continue Tresiba 13 units SC QHS \par - Continue Lyumjev with correction scale and carb ratio (father states they use 1:6 at breakfast and 1:5 at dinner): 10-12 units before breakfast, continue Lyumjev 20-23 units before dinner \par -  Father reports he is comfortable titrating/making adjustments to insulin doses based on home blood glucose monitoring, prefers to continue with same regimen at this time and make adjustments PRN\par - Discussed hypoglycemia symptoms & management w/ patient's father  \par - Reviewed BG goals- fasting 80-130mg/dl, premeal < 140mg/dL, 2 hours post prandial < 180mg/dL \par - Has glucagon\par - Has ketone strips at home\par - Up to date with ophtho, seen within past year per father - annual optho visit encouraged, more frequently if any issues  \par \par HLD\par - 10/22 LDL 80\par - Will continue to monitor annually off treatment  \par - LDL goal < 70\par \par HTN \par - no nephropathy \par - repeat alb/cr today \par - BP controlled, not on antihypertensives \par - BP goal < 130/80 \par \par HCM \par 10/2022 TSH 0.75\par 10/2022 VIt D 46.1\par \par RTC in 3-4 months\par Contact clinic if persistent hyperglycemia or hypoglycemia \par \par

## 2023-05-23 NOTE — END OF VISIT
[FreeTextEntry3] : 27 year old woman with T1DM, well controlled and Fragile X syndrome.  Continue basal bolus insulin as outlined. Reviewed benefits of wearing Dexcom.\par

## 2023-05-23 NOTE — PHYSICAL EXAM
[Alert] : alert [Well Nourished] : well nourished [Healthy Appearance] : healthy appearance [EOMI] : extra ocular movement intact [Normal Hearing] : hearing was normal [No Neck Mass] : no neck mass was observed [No Thyroid Nodules] : no palpable thyroid nodules [No Respiratory Distress] : no respiratory distress [No Accessory Muscle Use] : no accessory muscle use [Clear to Auscultation] : lungs were clear to auscultation bilaterally [Normal S1, S2] : normal S1 and S2 [Normal Rate] : heart rate was normal [No Edema] : no peripheral edema [Not Tender] : non-tender [Soft] : abdomen soft [No Stigmata of Cushings Syndrome] : no stigmata of Cushings Syndrome [Normal Gait] : normal gait [No Sensory Deficits] : the sensory exam was normal to light touch and pinprick [No Tremors] : no tremors [Thyroid Not Enlarged] : the thyroid was not enlarged [Right foot was examined, including] : right foot ~C was examined, including visual inspection with sensory and pulse exams [Left foot was examined, including] : left foot ~C was examined, including visual inspection with sensory and pulse exams [Normal] : normal [Full ROM] : with full range of motion [Diminished Throughout Both Feet] : normal tactile sensation with monofilament testing throughout both feet [de-identified] : generalized atopic dermatitis/eczema, erythematous/excoriated skin noted on b/l UE & LE [de-identified] : calm, cooperative

## 2023-05-23 NOTE — HISTORY OF PRESENT ILLNESS
[FreeTextEntry1] : 27 year old female here for DM 1 and fragile X syndrome. \par Accompanied by father.\par \par DM 1 diagnosed at 6 years old. \par POCT A1c checked in office today 7.3%\par 5/2021 A1c 7.7% > 12/2021 7.2% > 4/2022 7.5%> 10/2023 7%\par \par Tresiba 13, Lyumjev 10-12 (sometimes up to 16) before breakfast \par skips lunch insulin - she generally has a very low carb snack during lunch time.\par Lyumjev 20-23 (up to 27) before dinner\par Patient's father notices significant improvement in BG using Lyumjev over Humalog (faster onset and shorter duration) \par  \par Suffers from OCD as well per mom & following with psych for this. \par AM fasting 80s-260s (mainly 140's-150's), pre-lunch (, highest 210), pre-dinner ()\par Hypo: one noted in the last month (68 before dinner) - pt able to recognize and treat appropriately with hard candy. Has glucagon at home (has not needed recently).\par Reports that parents & caretakers are always watching patient, uses Dexcom G6 occasionally & FS at home for the most part. \par \par No changes to patient's diet, other medications, activity, fever, URI sx, dysuria. Feeling well otherwise. Eczema stable- no recent steroids, not using dupixent- did not help with skin per mother, did improve hair thickness however she says. \par \par Carb Ratios (father reports however that they have not been needing to use carb ratios as patient has been eating the exact same meals every day; only using carb ratios if patient is eating something different). \par 1:6 ac breakfast (typically 60-70grams of carbs)\par Does not give pre lunch insulin as patient goes to day program and only has small snack 25-30grams of carbs \par 1:5 ac dinner (typically 100-110 grams of carbs) \par Patient always has a bedtime snack, typically 20-30 grams of carbs \par Francine factor 1:40 with BGT \par \par Discussed obtaining medical alert bracelet with father but reports that patient does not need as those around her know she has T1DM. \par \par Diet: \par B: waffle(s), sugar free jello, and fruits (apple orange strawberry) (60 to 70 grams of carbs) \par L: eats a snack - low carb usually, if carbs no more than 25g & they do not cover for this (SF pudding, granola) as she goes to day program 10am -3pm \par D: hamburger and french fries or taco salad, chicken nuggets and fries + fruit + SF jello  (100-110grams of carbs) \par qhs snack: jello or fruits (20-30 grams of carbs) \par snacks on veggies & peanuts \par \par Last dilated eye exam within this past year, no DM retinopathy \par No nephropathy, no neuropathy\par No known macrovascular complications \par \par Currently having regular menses.

## 2023-05-23 NOTE — REVIEW OF SYSTEMS
[Dry Skin] : dry skin [All other systems negative] : All other systems negative [Fatigue] : no fatigue [Decreased Appetite] : appetite not decreased [Recent Weight Gain (___ Lbs)] : no recent weight gain [Recent Weight Loss (___ Lbs)] : no recent weight loss [Blurred Vision] : no blurred vision [Eyes Itch] : no itch [Redness] : no redness  [Dysphagia] : no dysphagia [Chest Pain] : no chest pain [Palpitations] : no palpitations [Shortness Of Breath] : no shortness of breath [Nausea] : no nausea [Constipation] : no constipation [Vomiting] : no vomiting [Diarrhea] : no diarrhea [Dizziness] : no dizziness [Pain/Numbness of Digits] : no pain/numbness of digits [Polydipsia] : no polydipsia [Cold Intolerance] : no cold intolerance [Heat Intolerance] : no heat intolerance [de-identified] : severe eczema

## 2023-05-24 RX ORDER — LANCETS 33 GAUGE
EACH MISCELLANEOUS
Qty: 300 | Refills: 5 | Status: ACTIVE | COMMUNITY
Start: 2023-05-23

## 2023-08-01 DIAGNOSIS — G47.00 INSOMNIA, UNSPECIFIED: ICD-10-CM

## 2023-08-07 RX ORDER — ADHESIVE TAPE 3"X 2.3 YD
5 TAPE, NON-MEDICATED TOPICAL
Qty: 90 | Refills: 1 | Status: DISCONTINUED | COMMUNITY
Start: 2023-08-01 | End: 2023-08-07

## 2023-09-01 ENCOUNTER — APPOINTMENT (OUTPATIENT)
Dept: DERMATOLOGY | Facility: CLINIC | Age: 28
End: 2023-09-01
Payer: MEDICAID

## 2023-09-01 ENCOUNTER — OUTPATIENT (OUTPATIENT)
Dept: OUTPATIENT SERVICES | Facility: HOSPITAL | Age: 28
LOS: 1 days | End: 2023-09-01

## 2023-09-01 VITALS
WEIGHT: 134 LBS | BODY MASS INDEX: 22.33 KG/M2 | OXYGEN SATURATION: 97 % | HEIGHT: 65 IN | DIASTOLIC BLOOD PRESSURE: 74 MMHG | HEART RATE: 87 BPM | SYSTOLIC BLOOD PRESSURE: 126 MMHG

## 2023-09-01 DIAGNOSIS — L85.3 XEROSIS CUTIS: ICD-10-CM

## 2023-09-01 PROCEDURE — 99214 OFFICE O/P EST MOD 30 MIN: CPT

## 2023-09-06 RX ORDER — TACROLIMUS 1 MG/G
0.1 OINTMENT TOPICAL
Qty: 1 | Refills: 5 | Status: ACTIVE | COMMUNITY
Start: 2018-01-11 | End: 1900-01-01

## 2023-09-12 ENCOUNTER — APPOINTMENT (OUTPATIENT)
Dept: ENDOCRINOLOGY | Facility: CLINIC | Age: 28
End: 2023-09-12

## 2023-09-12 DIAGNOSIS — L85.3 XEROSIS CUTIS: ICD-10-CM

## 2023-09-12 DIAGNOSIS — L20.9 ATOPIC DERMATITIS, UNSPECIFIED: ICD-10-CM

## 2023-11-02 ENCOUNTER — APPOINTMENT (OUTPATIENT)
Dept: INTERNAL MEDICINE | Facility: CLINIC | Age: 28
End: 2023-11-02
Payer: MEDICAID

## 2023-11-02 ENCOUNTER — OUTPATIENT (OUTPATIENT)
Dept: OUTPATIENT SERVICES | Facility: HOSPITAL | Age: 28
LOS: 1 days | End: 2023-11-02

## 2023-11-02 VITALS
HEIGHT: 65 IN | BODY MASS INDEX: 22.16 KG/M2 | SYSTOLIC BLOOD PRESSURE: 130 MMHG | OXYGEN SATURATION: 99 % | HEART RATE: 85 BPM | DIASTOLIC BLOOD PRESSURE: 84 MMHG | RESPIRATION RATE: 16 BRPM | WEIGHT: 133 LBS

## 2023-11-02 DIAGNOSIS — L20.9 ATOPIC DERMATITIS, UNSPECIFIED: ICD-10-CM

## 2023-11-02 DIAGNOSIS — Q99.2 FRAGILE X CHROMOSOME: ICD-10-CM

## 2023-11-02 PROCEDURE — 99213 OFFICE O/P EST LOW 20 MIN: CPT

## 2023-11-02 RX ORDER — HYDROCORTISONE 25 MG/G
2.5 OINTMENT TOPICAL
Qty: 1 | Refills: 0 | Status: COMPLETED | COMMUNITY
Start: 2021-02-25 | End: 2023-11-02

## 2023-11-02 RX ORDER — CHLORHEXIDINE GLUCONATE 4 %
5 LIQUID (ML) TOPICAL
Qty: 30 | Refills: 5 | Status: ACTIVE | COMMUNITY
Start: 2023-08-07 | End: 1900-01-01

## 2023-11-02 RX ORDER — CLOBETASOL PROPIONATE 0.5 MG/G
0.05 OINTMENT TOPICAL
Qty: 1 | Refills: 0 | Status: COMPLETED | COMMUNITY
Start: 2018-09-27 | End: 2023-11-02

## 2023-11-02 RX ORDER — BETAMETHASONE VALERATE 1.2 MG/G
0.1 OINTMENT TOPICAL
Qty: 1 | Refills: 2 | Status: COMPLETED | COMMUNITY
Start: 2022-05-06 | End: 2023-11-02

## 2023-11-03 DIAGNOSIS — Q99.2 FRAGILE X CHROMOSOME: ICD-10-CM

## 2023-11-03 DIAGNOSIS — E10.9 TYPE 1 DIABETES MELLITUS WITHOUT COMPLICATIONS: ICD-10-CM

## 2023-11-03 DIAGNOSIS — L20.9 ATOPIC DERMATITIS, UNSPECIFIED: ICD-10-CM

## 2023-11-07 ENCOUNTER — OUTPATIENT (OUTPATIENT)
Dept: OUTPATIENT SERVICES | Facility: HOSPITAL | Age: 28
LOS: 1 days | End: 2023-11-07

## 2023-11-07 ENCOUNTER — APPOINTMENT (OUTPATIENT)
Dept: INTERNAL MEDICINE | Facility: CLINIC | Age: 28
End: 2023-11-07

## 2023-11-08 DIAGNOSIS — E10.9 TYPE 1 DIABETES MELLITUS WITHOUT COMPLICATIONS: ICD-10-CM

## 2023-11-08 LAB
ALBUMIN SERPL ELPH-MCNC: 5 G/DL
ALP BLD-CCNC: 64 U/L
ALT SERPL-CCNC: 14 U/L
ANION GAP SERPL CALC-SCNC: 11 MMOL/L
AST SERPL-CCNC: 15 U/L
BILIRUB SERPL-MCNC: 0.3 MG/DL
BUN SERPL-MCNC: 14 MG/DL
CALCIUM SERPL-MCNC: 9.4 MG/DL
CHLORIDE SERPL-SCNC: 98 MMOL/L
CHOLEST SERPL-MCNC: 138 MG/DL
CO2 SERPL-SCNC: 30 MMOL/L
CREAT SERPL-MCNC: 0.65 MG/DL
EGFR: 124 ML/MIN/1.73M2
ESTIMATED AVERAGE GLUCOSE: 163 MG/DL
GLUCOSE SERPL-MCNC: 244 MG/DL
HBA1C MFR BLD HPLC: 7.3 %
HCT VFR BLD CALC: 48.1 %
HDLC SERPL-MCNC: 48 MG/DL
HGB BLD-MCNC: 14.9 G/DL
LDLC SERPL CALC-MCNC: 69 MG/DL
MCHC RBC-ENTMCNC: 29 PG
MCHC RBC-ENTMCNC: 31 GM/DL
MCV RBC AUTO: 93.8 FL
NONHDLC SERPL-MCNC: 90 MG/DL
PLATELET # BLD AUTO: 403 K/UL
POTASSIUM SERPL-SCNC: 4.1 MMOL/L
PROT SERPL-MCNC: 7.5 G/DL
RBC # BLD: 5.13 M/UL
RBC # FLD: 13.4 %
SODIUM SERPL-SCNC: 139 MMOL/L
TRIGL SERPL-MCNC: 119 MG/DL
TSH SERPL-ACNC: 0.8 UIU/ML
WBC # FLD AUTO: 7.81 K/UL

## 2023-11-13 ENCOUNTER — NON-APPOINTMENT (OUTPATIENT)
Age: 28
End: 2023-11-13

## 2023-11-15 ENCOUNTER — APPOINTMENT (OUTPATIENT)
Dept: INTERNAL MEDICINE | Facility: CLINIC | Age: 28
End: 2023-11-15

## 2023-12-19 ENCOUNTER — OUTPATIENT (OUTPATIENT)
Dept: OUTPATIENT SERVICES | Facility: HOSPITAL | Age: 28
LOS: 1 days | End: 2023-12-19

## 2023-12-19 ENCOUNTER — APPOINTMENT (OUTPATIENT)
Dept: INTERNAL MEDICINE | Facility: CLINIC | Age: 28
End: 2023-12-19
Payer: MEDICAID

## 2023-12-19 VITALS
HEIGHT: 65 IN | OXYGEN SATURATION: 97 % | WEIGHT: 134 LBS | BODY MASS INDEX: 22.33 KG/M2 | DIASTOLIC BLOOD PRESSURE: 70 MMHG | HEART RATE: 70 BPM | SYSTOLIC BLOOD PRESSURE: 123 MMHG

## 2023-12-19 DIAGNOSIS — J34.89 OTHER SPECIFIED DISORDERS OF NOSE AND NASAL SINUSES: ICD-10-CM

## 2023-12-19 PROCEDURE — 99213 OFFICE O/P EST LOW 20 MIN: CPT

## 2023-12-20 DIAGNOSIS — J34.89 OTHER SPECIFIED DISORDERS OF NOSE AND NASAL SINUSES: ICD-10-CM

## 2023-12-22 NOTE — ASSESSMENT
[FreeTextEntry1] : All questions and concerns were answered in detail. Clearance note provided to go back to school.

## 2023-12-22 NOTE — HISTORY OF PRESENT ILLNESS
[Parent] : parent [FreeTextEntry8] : The patient is a 27 y/o F who presents today with her father to get a clearance to go back to school. As per father the patient has runny nose for the past 2 days and the school is requiring a medical clearance note for her to go back to school.  Father denies any fever, chills, cough, SOB, and wheezing.

## 2023-12-22 NOTE — PHYSICAL EXAM
[Normal Sclera/Conjunctiva] : normal sclera/conjunctiva [PERRL] : pupils equal round and reactive to light [EOMI] : extraocular movements intact [No Lymphadenopathy] : no lymphadenopathy [Supple] : supple [No Respiratory Distress] : no respiratory distress  [No Accessory Muscle Use] : no accessory muscle use [Clear to Auscultation] : lungs were clear to auscultation bilaterally [Pedal Pulses Present] : the pedal pulses are present [Normal] : no joint swelling and grossly normal strength and tone [de-identified] : swollen nasal turbinates.

## 2023-12-22 NOTE — REVIEW OF SYSTEMS
[Nasal Discharge] : nasal discharge [Negative] : Genitourinary [Earache] : no earache [Hearing Loss] : no hearing loss [Nosebleed] : no nosebleeds [Hoarseness] : no hoarseness [Sore Throat] : no sore throat [Postnasal Drip] : no postnasal drip

## 2024-02-07 RX ORDER — DUPILUMAB 300 MG/2ML
300 INJECTION, SOLUTION SUBCUTANEOUS
Qty: 1 | Refills: 6 | Status: ACTIVE | COMMUNITY
Start: 2021-01-28 | End: 1900-01-01

## 2024-02-28 ENCOUNTER — RX RENEWAL (OUTPATIENT)
Age: 29
End: 2024-02-28

## 2024-02-28 RX ORDER — BLOOD-GLUCOSE TRANSMITTER
EACH MISCELLANEOUS
Qty: 1 | Refills: 3 | Status: ACTIVE | COMMUNITY
Start: 2019-12-02 | End: 1900-01-01

## 2024-03-05 ENCOUNTER — APPOINTMENT (OUTPATIENT)
Dept: ENDOCRINOLOGY | Facility: CLINIC | Age: 29
End: 2024-03-05
Payer: MEDICAID

## 2024-03-05 ENCOUNTER — OUTPATIENT (OUTPATIENT)
Dept: OUTPATIENT SERVICES | Facility: HOSPITAL | Age: 29
LOS: 1 days | End: 2024-03-05

## 2024-03-05 VITALS
HEART RATE: 97 BPM | SYSTOLIC BLOOD PRESSURE: 124 MMHG | DIASTOLIC BLOOD PRESSURE: 68 MMHG | RESPIRATION RATE: 16 BRPM | OXYGEN SATURATION: 98 % | HEIGHT: 65 IN | WEIGHT: 134 LBS | BODY MASS INDEX: 22.33 KG/M2

## 2024-03-05 DIAGNOSIS — E10.9 TYPE 1 DIABETES MELLITUS WITHOUT COMPLICATIONS: ICD-10-CM

## 2024-03-05 DIAGNOSIS — E10.9 TYPE 1 DIABETES MELLITUS W/OUT COMPLICATIONS: ICD-10-CM

## 2024-03-05 DIAGNOSIS — E78.5 HYPERLIPIDEMIA, UNSPECIFIED: ICD-10-CM

## 2024-03-05 PROCEDURE — 99214 OFFICE O/P EST MOD 30 MIN: CPT | Mod: GC

## 2024-03-05 NOTE — ASSESSMENT
[FreeTextEntry1] : 29 yo F with T1DM and Fragile X Syndrome presents for follow up.   T1DM - recent labs 11/23 A1c 7.3, LDL 69, HDL 48, , , 5/21 urine microalbumin WNL - current medications: Tresiba 16 units at bedtime, Lyumjev 1:6 ICR for breakfast and 1:5 ICR for dinner, 1:40-50 ISF  - SMBG with one touch glucometer, gets eczema with dexcom, am: mostly 90-high 100s, occasionally hypoglycemic, before lunch 50/, before dinner high 100s/200s - appears may be getting a bit to much insulin at breakfast causing lows before lunch snack and unable to get coverage for lunch snack as at school therefore causing intermittent highs before dinner PLAN - continue Tresiba 16 units at bedtime, - adjust to Lyumjev 1:6.5 ICR for breakfast, continue 1:5 ICR for dinner, 1:40-50 ISF  - Encouraged using DEXCOM G6 CGM (father uses intermittently and checks manual FS as well), discussed to try using flonase topically as a possible solution  - check A1c, urine microalbumin today - BG targets: fasting 80-130mg/dl, premeal < 140mg/dL, 2 hours post prandial < 180mg/dL - Has glucagon, ketone strips at home - routine ophthalmology follow up  HLD - 11/23 LDL 69, , HDL 48 PLAN - Will continue to monitor annually off treatment - LDL goal < 70  HTN - 5/21 urine microalbumin WNL - BP at goal today - 11/23 GFR WNL PLAN - repeat alb/cr today - BP controlled, not on antihypertensives - BP goal < 130/80  HCM 11/23 TSH 0.8  RTC in 3 months.  Case seen and discussed with Dr. Meza.  Dmitriy Meeks MD Endocrinology Fellow

## 2024-03-05 NOTE — END OF VISIT
[] : Fellow [FreeTextEntry3] : 27 year old woman with T1DM, well controlled and Fragile X syndrome. Hypoglycemia around lunchtime, decrease insulin with breakfast. Try using DEXCOM with antiallergic topical medication.

## 2024-03-05 NOTE — REVIEW OF SYSTEMS
[Fatigue] : no fatigue [Recent Weight Gain (___ Lbs)] : no recent weight gain [Recent Weight Loss (___ Lbs)] : no recent weight loss [Palpitations] : no palpitations [Chest Pain] : no chest pain [Shortness Of Breath] : no shortness of breath [Nausea] : no nausea [Constipation] : no constipation [Vomiting] : no vomiting [Diarrhea] : no diarrhea [Polyuria] : no polyuria [Irregular Menses] : regular menses [Pain/Numbness of Digits] : no pain/numbness of digits

## 2024-03-05 NOTE — HISTORY OF PRESENT ILLNESS
[FreeTextEntry1] : 28 year old female here for DM 1 and fragile X syndrome.  Accompanied by father and sister.  DM 1 diagnosed at 6 years old.  - recent labs 11/23 A1c 7.3, LDL 69, HDL 48, , , 5/21 urine microalbumin WNL - current medications: Tresiba 16 units at bedtime, Lyumjev 1:6 ICR for breakfast and 1:5 ICR for dinner, 1:40-50 ISF  - SMBG with one touch glucometer, gets eczema with dexcom, am: mostly 90-high 100s, occasionally hypoglycemic, before lunch 50/, before dinner high 100s/200s - diet Breakfast: waffle, fruit, jello, yogurt, chocolate Will have snack for lunch but no one to give her insulin Dinner: burger, fries, taco, salad, macaroni and cheese, occasionally bagel Snacks: at lunch include vegetables, feta, carb free, will have bedtime snack fruit and low carb jello Does get symptoms when hypoglycemic, will have two candys. has glucagon at home  - Exercise: reports active - Last dilated eye exam within this past year per family, no DM retinopathy  - No nephropathy, denies neuropathy No known macrovascular complications   - family endorses regular menses.

## 2024-03-05 NOTE — PHYSICAL EXAM
[Alert] : alert [No Acute Distress] : no acute distress [Normal Sclera/Conjunctiva] : normal sclera/conjunctiva [No Proptosis] : no proptosis [No Neck Mass] : no neck mass was observed [Thyroid Not Enlarged] : the thyroid was not enlarged [No Thyroid Nodules] : no palpable thyroid nodules [Clear to Auscultation] : lungs were clear to auscultation bilaterally [No Respiratory Distress] : no respiratory distress [Normal S1, S2] : normal S1 and S2 [Regular Rhythm] : with a regular rhythm [Normal Bowel Sounds] : normal bowel sounds [Not Tender] : non-tender [Soft] : abdomen soft [No Stigmata of Cushings Syndrome] : no stigmata of Cushings Syndrome [Normal Strength/Tone] : muscle strength and tone were normal [No Involuntary Movements] : no involuntary movements were seen [No Motor Deficits] : the motor exam was normal [No Tremors] : no tremors [Normal Affect] : the affect was normal [Normal Mood] : the mood was normal [Acanthosis Nigricans] : no acanthosis nigricans [de-identified] : erythematous rash on legs

## 2024-03-06 ENCOUNTER — NON-APPOINTMENT (OUTPATIENT)
Age: 29
End: 2024-03-06

## 2024-03-06 LAB
CREAT SPEC-SCNC: 105 MG/DL
ESTIMATED AVERAGE GLUCOSE: 163 MG/DL
HBA1C MFR BLD HPLC: 7.3 %
MICROALBUMIN 24H UR DL<=1MG/L-MCNC: <1.2 MG/DL
MICROALBUMIN/CREAT 24H UR-RTO: NORMAL MG/G

## 2024-03-25 ENCOUNTER — RX RENEWAL (OUTPATIENT)
Age: 29
End: 2024-03-25

## 2024-03-25 RX ORDER — INSULIN LISPRO-AABC 100 [IU]/ML
100 INJECTION, SOLUTION SUBCUTANEOUS
Qty: 1 | Refills: 11 | Status: ACTIVE | COMMUNITY
Start: 2023-02-28 | End: 1900-01-01

## 2024-04-08 RX ORDER — BLOOD-GLUCOSE METER
KIT MISCELLANEOUS
Qty: 3 | Refills: 5 | Status: DISCONTINUED | COMMUNITY
Start: 2017-06-27 | End: 2024-04-08

## 2024-04-09 RX ORDER — BLOOD SUGAR DIAGNOSTIC
STRIP MISCELLANEOUS
Qty: 3 | Refills: 6 | Status: ACTIVE | COMMUNITY
Start: 2020-04-28 | End: 1900-01-01

## 2024-05-27 RX ORDER — DIPHENHYDRAMINE HCL 25 MG/1
25 TABLET ORAL EVERY 6 HOURS
Qty: 120 | Refills: 2 | Status: ACTIVE | COMMUNITY
Start: 2021-08-20 | End: 1900-01-01

## 2024-05-28 ENCOUNTER — RX RENEWAL (OUTPATIENT)
Age: 29
End: 2024-05-28

## 2024-05-28 RX ORDER — PEN NEEDLE, DIABETIC 29 G X1/2"
32G X 4 MM NEEDLE, DISPOSABLE MISCELLANEOUS
Qty: 1 | Refills: 5 | Status: ACTIVE | COMMUNITY
Start: 2018-12-27 | End: 1900-01-01

## 2024-05-28 RX ORDER — PEN NEEDLE, DIABETIC 32GX 5/32"
32G X 4 MM NEEDLE, DISPOSABLE MISCELLANEOUS
Qty: 4 | Refills: 3 | Status: ACTIVE | COMMUNITY
Start: 2024-05-28 | End: 1900-01-01

## 2024-06-04 RX ORDER — INSULIN DEGLUDEC INJECTION 100 U/ML
100 INJECTION, SOLUTION SUBCUTANEOUS
Qty: 5 | Refills: 5 | Status: ACTIVE | COMMUNITY
Start: 2022-05-03 | End: 1900-01-01

## 2024-06-24 RX ORDER — BLOOD-GLUCOSE SENSOR
EACH MISCELLANEOUS
Qty: 1 | Refills: 3 | Status: ACTIVE | COMMUNITY
Start: 2021-11-05 | End: 1900-01-01

## 2024-07-01 ENCOUNTER — RX RENEWAL (OUTPATIENT)
Age: 29
End: 2024-07-01

## 2024-08-16 ENCOUNTER — APPOINTMENT (OUTPATIENT)
Dept: DERMATOLOGY | Facility: CLINIC | Age: 29
End: 2024-08-16
Payer: MEDICAID

## 2024-08-16 VITALS
BODY MASS INDEX: 22.66 KG/M2 | WEIGHT: 136 LBS | DIASTOLIC BLOOD PRESSURE: 80 MMHG | OXYGEN SATURATION: 98 % | SYSTOLIC BLOOD PRESSURE: 120 MMHG | HEART RATE: 87 BPM | HEIGHT: 65 IN

## 2024-08-16 DIAGNOSIS — L20.9 ATOPIC DERMATITIS, UNSPECIFIED: ICD-10-CM

## 2024-08-16 PROCEDURE — G2211 COMPLEX E/M VISIT ADD ON: CPT | Mod: NC,1L

## 2024-08-16 PROCEDURE — 99214 OFFICE O/P EST MOD 30 MIN: CPT

## 2024-08-16 RX ORDER — DUPILUMAB 300 MG/2ML
300 INJECTION, SOLUTION SUBCUTANEOUS
Qty: 1 | Refills: 11 | Status: ACTIVE | COMMUNITY
Start: 2024-08-16 | End: 1900-01-01

## 2024-08-16 NOTE — PHYSICAL EXAM
[Declined] : declined [FreeTextEntry3] : thick pink lichenified plaques over the b/l LE, b/l UE, ~10-15% BSA lichenification of periorbital skin xerosis

## 2024-08-16 NOTE — HISTORY OF PRESENT ILLNESS
[de-identified] : 28 yo F fragile X with atopic derm on dupixent  Initial hx: 9/1/23: Accompanied by dad, reports doing well now on dupixent and protopic alone. Still with rough areas but much improved. They'd like to continue dupixent and protopic for now; prefer to avoid topicals. Note she is using fragrance free products.   Today: 8/16/24 Accompanied by dad, reports doing well now on dupixent and protopic alone. Still with rough areas but much improved. They'd like to continue dupixent and protopic for now; prefer to avoid topical steroids. Using kirill butter moisturizer.   [FreeTextEntry1] : fuv- AD

## 2024-08-16 NOTE — ASSESSMENT
[FreeTextEntry1] : #Severe atopic derm, still with 10% BSA - chronic; flaring Overall improved on dupixent compared to baseline - Diagnosis, chronic nature, disease course, treatment options and goals of therapy discussed. Discussed alternative tx options, but dad defers. Pt's dad deferring steroid topical as well - Recommended stopping kirill butter moisturizer- start la roche posay or cerave cream moisturizer TID after showering - c/w dupixent 300mg q2 weeks and topical protopic 0.1% ointment BID PRN, for now given dad reports she is stable on this and this is their preference; they understand to RTC sooner should she flare again   1 year refills given for dupixent and protopic  RTC 1 year or PRN

## 2024-09-24 ENCOUNTER — APPOINTMENT (OUTPATIENT)
Dept: ENDOCRINOLOGY | Facility: CLINIC | Age: 29
End: 2024-09-24

## 2024-09-24 ENCOUNTER — OUTPATIENT (OUTPATIENT)
Dept: OUTPATIENT SERVICES | Facility: HOSPITAL | Age: 29
LOS: 1 days | End: 2024-09-24

## 2024-09-24 VITALS
BODY MASS INDEX: 22.66 KG/M2 | HEIGHT: 65 IN | DIASTOLIC BLOOD PRESSURE: 64 MMHG | WEIGHT: 136 LBS | SYSTOLIC BLOOD PRESSURE: 112 MMHG | OXYGEN SATURATION: 98 % | HEART RATE: 89 BPM | RESPIRATION RATE: 16 BRPM

## 2024-09-24 DIAGNOSIS — E10.9 TYPE 1 DIABETES MELLITUS W/OUT COMPLICATIONS: ICD-10-CM

## 2024-09-24 DIAGNOSIS — E78.5 HYPERLIPIDEMIA, UNSPECIFIED: ICD-10-CM

## 2024-09-24 PROCEDURE — 99214 OFFICE O/P EST MOD 30 MIN: CPT

## 2024-09-24 PROCEDURE — G2211 COMPLEX E/M VISIT ADD ON: CPT | Mod: NC

## 2024-09-26 LAB — HBA1C MFR BLD HPLC: 7.2

## 2024-09-27 DIAGNOSIS — E10.9 TYPE 1 DIABETES MELLITUS WITHOUT COMPLICATIONS: ICD-10-CM

## 2024-09-27 DIAGNOSIS — E78.5 HYPERLIPIDEMIA, UNSPECIFIED: ICD-10-CM

## 2024-09-27 NOTE — REVIEW OF SYSTEMS
[Fatigue] : no fatigue [Recent Weight Gain (___ Lbs)] : no recent weight gain [Recent Weight Loss (___ Lbs)] : no recent weight loss [Chest Pain] : no chest pain [Palpitations] : no palpitations [Shortness Of Breath] : no shortness of breath [Nausea] : no nausea [Constipation] : no constipation [Vomiting] : no vomiting [Diarrhea] : no diarrhea [Polyuria] : no polyuria [Irregular Menses] : regular menses [Pain/Numbness of Digits] : no pain/numbness of digits

## 2024-09-27 NOTE — END OF VISIT
[Time Spent: ___ minutes] : I have spent [unfilled] minutes of time on the encounter which excludes teaching and separately reported services. [] : Fellow [FreeTextEntry3] : DM1 recently much improved control with increase in Tresiba dose and rare hypoglycemia (mild and well sensed).

## 2024-09-27 NOTE — ASSESSMENT
[FreeTextEntry1] : 27 yo F with T1DM and Fragile X Syndrome presents for follow up.   T1DM - recent labs 11/23 A1c 7.3, LDL 69, HDL 48, , , 5/21 urine microalbumin WNL - current medications: Tresiba 18 units at bedtime, Lyumjev 8-12 ICR for breakfast and 20-22 unis ICR for dinner, 1:40-50 ISF  - SMBG with one touch glucometer, gets eczema with dexcom, am: mostly 80s-100s, occasionally hypoglycemic, before lunch 50/, before dinner high   PLAN - continue Tresiba 18 units at bedtime - c/w Lyumjev 1:6.5 ICR for breakfast, continue 1:5 ICR for dinner (though father reports he is not using ICR), 1:40-50 ISF  - c/w FS fasting and premeal - check A1c today - BG targets: fasting 80-130mg/dl, premeal < 140mg/dL, 2 hours post prandial < 180mg/dL - Has glucagon, ketone strips at home - routine ophthalmology follow up  HLD - 11/23 LDL 69, , HDL 48 PLAN - Will continue to monitor annually off treatment - LDL goal < 70  HTN - 3/24 urine microalbumin WNL - BP at goal today - 11/23 GFR WNL PLAN - BP controlled, not on antihypertensives - BP goal < 130/80  HCM 11/23 TSH 0.8  RTC in 6 months.  Case seen and discussed with Dr. Marlen Jacobs MD Endocrinology Fellow

## 2024-09-27 NOTE — HISTORY OF PRESENT ILLNESS
[FreeTextEntry1] : 29 year old female with DM 1 and fragile X syndrome presenting for follow-up. Accompanied by father.  DM 1 diagnosed at 6 years old.  - recent labs 3/24 7.3%, UACR WNL, 11/23 A1c 7.3, LDL 69, HDL 48, , , 5/21 urine microalbumin WNL - current medications: Tresiba 18 units at bedtime, Lyumjev 8-12 units for breakfast depending on quantity and 20-22 units for dinner. ISF 1:40-50  - does not follow ICR - SMBG with one touch glucometer, gets eczema with dexcom, am: mostly 80s-100s, occasionally hypoglycemic, before lunch 50/, before dinner high  - Diet Breakfast: waffle, fruit, jello, yogurt, chocolate Will have snack for lunch but no one to give her insulin Dinner: burger and fries, chicken nuggets, pizza, salad Snacks: at lunch include vegetables, feta, carb free, will have bedtime snack fruit and low carb jello Does get symptoms when hypoglycemic, will have two candys. Has glucagon at home, has not had to use - Exercise: takes long walks - may go hypo during those episodes - Last dilated eye exam within this past year per family, no DM retinopathy  - No nephropathy, denies neuropathy - No known macrovascular complications  - family endorses regular menses

## 2024-09-27 NOTE — ASSESSMENT
[FreeTextEntry1] : 29 yo F with T1DM and Fragile X Syndrome presents for follow up.   T1DM - recent labs 11/23 A1c 7.3, LDL 69, HDL 48, , , 5/21 urine microalbumin WNL - current medications: Tresiba 18 units at bedtime, Lyumjev 8-12 ICR for breakfast and 20-22 unis ICR for dinner, 1:40-50 ISF  - SMBG with one touch glucometer, gets eczema with dexcom, am: mostly 80s-100s, occasionally hypoglycemic, before lunch 50/, before dinner high   PLAN - continue Tresiba 18 units at bedtime - c/w Lyumjev 1:6.5 ICR for breakfast, continue 1:5 ICR for dinner (though father reports he is not using ICR), 1:40-50 ISF  - c/w FS fasting and premeal - check A1c today - BG targets: fasting 80-130mg/dl, premeal < 140mg/dL, 2 hours post prandial < 180mg/dL - Has glucagon, ketone strips at home - routine ophthalmology follow up  HLD - 11/23 LDL 69, , HDL 48 PLAN - Will continue to monitor annually off treatment - LDL goal < 70  HTN - 3/24 urine microalbumin WNL - BP at goal today - 11/23 GFR WNL PLAN - BP controlled, not on antihypertensives - BP goal < 130/80  HCM 11/23 TSH 0.8  RTC in 6 months.  Case seen and discussed with Dr. Marlen Jacobs MD Endocrinology Fellow

## 2024-09-27 NOTE — PHYSICAL EXAM
[Alert] : alert [No Acute Distress] : no acute distress [Normal Sclera/Conjunctiva] : normal sclera/conjunctiva [No Proptosis] : no proptosis [No Neck Mass] : no neck mass was observed [Thyroid Not Enlarged] : the thyroid was not enlarged [No Thyroid Nodules] : no palpable thyroid nodules [No Respiratory Distress] : no respiratory distress [Clear to Auscultation] : lungs were clear to auscultation bilaterally [Normal S1, S2] : normal S1 and S2 [Regular Rhythm] : with a regular rhythm [Normal Bowel Sounds] : normal bowel sounds [Not Tender] : non-tender [Soft] : abdomen soft [No Stigmata of Cushings Syndrome] : no stigmata of Cushings Syndrome [No Involuntary Movements] : no involuntary movements were seen [Normal Strength/Tone] : muscle strength and tone were normal [No Motor Deficits] : the motor exam was normal [No Tremors] : no tremors [Normal Affect] : the affect was normal [Normal Mood] : the mood was normal [Acanthosis Nigricans] : no acanthosis nigricans [de-identified] : erythematous rash on legs

## 2024-09-27 NOTE — PHYSICAL EXAM
[Alert] : alert [No Acute Distress] : no acute distress [Normal Sclera/Conjunctiva] : normal sclera/conjunctiva [No Proptosis] : no proptosis [No Neck Mass] : no neck mass was observed [Thyroid Not Enlarged] : the thyroid was not enlarged [No Thyroid Nodules] : no palpable thyroid nodules [No Respiratory Distress] : no respiratory distress [Clear to Auscultation] : lungs were clear to auscultation bilaterally [Normal S1, S2] : normal S1 and S2 [Regular Rhythm] : with a regular rhythm [Normal Bowel Sounds] : normal bowel sounds [Not Tender] : non-tender [Soft] : abdomen soft [No Stigmata of Cushings Syndrome] : no stigmata of Cushings Syndrome [No Involuntary Movements] : no involuntary movements were seen [Normal Strength/Tone] : muscle strength and tone were normal [No Motor Deficits] : the motor exam was normal [No Tremors] : no tremors [Normal Affect] : the affect was normal [Normal Mood] : the mood was normal [Acanthosis Nigricans] : no acanthosis nigricans [de-identified] : erythematous rash on legs

## 2024-12-02 ENCOUNTER — OUTPATIENT (OUTPATIENT)
Dept: OUTPATIENT SERVICES | Facility: HOSPITAL | Age: 29
LOS: 1 days | End: 2024-12-02

## 2024-12-02 ENCOUNTER — APPOINTMENT (OUTPATIENT)
Dept: INTERNAL MEDICINE | Facility: CLINIC | Age: 29
End: 2024-12-02

## 2024-12-02 VITALS
SYSTOLIC BLOOD PRESSURE: 110 MMHG | HEIGHT: 65 IN | DIASTOLIC BLOOD PRESSURE: 60 MMHG | WEIGHT: 134.38 LBS | BODY MASS INDEX: 22.39 KG/M2

## 2024-12-02 DIAGNOSIS — R21 RASH AND OTHER NONSPECIFIC SKIN ERUPTION: ICD-10-CM

## 2024-12-02 DIAGNOSIS — R23.8 OTHER SKIN CHANGES: ICD-10-CM

## 2024-12-02 DIAGNOSIS — Z86.19 PERSONAL HISTORY OF OTHER INFECTIOUS AND PARASITIC DISEASES: ICD-10-CM

## 2024-12-02 DIAGNOSIS — F42.8 OTHER OBSESSIVE COMPULSIVE DISORDER: ICD-10-CM

## 2024-12-02 DIAGNOSIS — Z87.898 PERSONAL HISTORY OF OTHER SPECIFIED CONDITIONS: ICD-10-CM

## 2024-12-02 DIAGNOSIS — Z00.00 ENCOUNTER FOR GENERAL ADULT MEDICAL EXAMINATION W/OUT ABNORMAL FINDINGS: ICD-10-CM

## 2024-12-02 DIAGNOSIS — Z74.09 OTHER REDUCED MOBILITY: ICD-10-CM

## 2024-12-02 DIAGNOSIS — B07.9 VIRAL WART, UNSPECIFIED: ICD-10-CM

## 2024-12-02 DIAGNOSIS — E10.9 TYPE 1 DIABETES MELLITUS W/OUT COMPLICATIONS: ICD-10-CM

## 2024-12-02 DIAGNOSIS — Z91.89 OTHER SPECIFIED PERSONAL RISK FACTORS, NOT ELSEWHERE CLASSIFIED: ICD-10-CM

## 2024-12-02 DIAGNOSIS — Z87.09 PERSONAL HISTORY OF OTHER DISEASES OF THE RESPIRATORY SYSTEM: ICD-10-CM

## 2024-12-02 DIAGNOSIS — Q99.2 FRAGILE X CHROMOSOME: ICD-10-CM

## 2024-12-02 DIAGNOSIS — N94.6 DYSMENORRHEA, UNSPECIFIED: ICD-10-CM

## 2024-12-02 DIAGNOSIS — F32.81 PREMENSTRUAL DYSPHORIC DISORDER: ICD-10-CM

## 2024-12-02 DIAGNOSIS — L20.9 ATOPIC DERMATITIS, UNSPECIFIED: ICD-10-CM

## 2024-12-02 DIAGNOSIS — L21.0 SEBORRHEA CAPITIS: ICD-10-CM

## 2024-12-02 PROCEDURE — 99385 PREV VISIT NEW AGE 18-39: CPT

## 2024-12-03 PROBLEM — Z74.09 DECREASED AMBULATION STATUS: Status: RESOLVED | Noted: 2021-12-09 | Resolved: 2024-12-03

## 2024-12-03 PROBLEM — Z86.19 HISTORY OF HERPES LABIALIS: Status: RESOLVED | Noted: 2021-05-07 | Resolved: 2024-12-03

## 2024-12-03 PROBLEM — Z87.09 HISTORY OF PARANASAL SINUS CONGESTION: Status: RESOLVED | Noted: 2018-09-06 | Resolved: 2024-12-03

## 2024-12-03 PROBLEM — L21.0 DANDRUFF, ADULT: Status: RESOLVED | Noted: 2021-12-06 | Resolved: 2024-12-03

## 2024-12-03 PROBLEM — N94.6 ADOLESCENT DYSMENORRHEA: Status: RESOLVED | Noted: 2017-06-22 | Resolved: 2024-12-03

## 2024-12-03 PROBLEM — B07.9 VERRUCA VULGARIS: Status: RESOLVED | Noted: 2019-06-27 | Resolved: 2024-12-03

## 2024-12-03 PROBLEM — F32.81 PREMENSTRUAL DYSPHORIC SYNDROME: Status: ACTIVE | Noted: 2024-12-03

## 2024-12-03 PROBLEM — Z87.09 HISTORY OF NASAL DISCHARGE: Status: RESOLVED | Noted: 2023-12-19 | Resolved: 2024-12-03

## 2024-12-03 PROBLEM — F42.8 OTHER OBSESSIVE-COMPULSIVE DISORDERS: Status: RESOLVED | Noted: 2019-09-19 | Resolved: 2024-12-03

## 2024-12-03 PROBLEM — Z87.898 HISTORY OF ABNORMAL WEIGHT LOSS: Status: RESOLVED | Noted: 2018-12-10 | Resolved: 2024-12-03

## 2024-12-03 PROBLEM — Z91.89 OCCASIONAL SUNSCREEN USE: Status: RESOLVED | Noted: 2022-07-20 | Resolved: 2024-12-03

## 2024-12-03 PROBLEM — Z87.898 HISTORY OF ABDOMINAL PAIN: Status: RESOLVED | Noted: 2020-07-20 | Resolved: 2024-12-03

## 2024-12-03 RX ORDER — NAPHAZOLINE HCL/PHENIRAMINE .0268-.315
0.1-0.3 DROPS OPHTHALMIC (EYE) 4 TIMES DAILY
Qty: 1 | Refills: 3 | Status: ACTIVE | COMMUNITY
Start: 2024-12-03 | End: 1900-01-01

## 2024-12-03 RX ORDER — ETHYL ALCOHOL 700 MG/ML
GEL TOPICAL
Qty: 1 | Refills: 3 | Status: ACTIVE | COMMUNITY
Start: 2024-12-03 | End: 1900-01-01

## 2024-12-05 RX ORDER — GLUCAGON INJECTION, SOLUTION 1 MG/.2ML
1 INJECTION, SOLUTION SUBCUTANEOUS AS DIRECTED
Qty: 1 | Refills: 2 | Status: ACTIVE | COMMUNITY
Start: 2024-12-05 | End: 1900-01-01

## 2024-12-12 ENCOUNTER — RX RENEWAL (OUTPATIENT)
Age: 29
End: 2024-12-12

## 2024-12-12 ENCOUNTER — NON-APPOINTMENT (OUTPATIENT)
Age: 29
End: 2024-12-12

## 2024-12-16 DIAGNOSIS — L20.9 ATOPIC DERMATITIS, UNSPECIFIED: ICD-10-CM

## 2024-12-16 DIAGNOSIS — E10.9 TYPE 1 DIABETES MELLITUS WITHOUT COMPLICATIONS: ICD-10-CM

## 2024-12-16 DIAGNOSIS — Q99.2 FRAGILE X CHROMOSOME: ICD-10-CM

## 2024-12-16 DIAGNOSIS — Z00.00 ENCOUNTER FOR GENERAL ADULT MEDICAL EXAMINATION WITHOUT ABNORMAL FINDINGS: ICD-10-CM

## 2024-12-16 DIAGNOSIS — F32.81 PREMENSTRUAL DYSPHORIC DISORDER: ICD-10-CM

## 2024-12-16 RX ORDER — THERMOMETER,ORAL,GLASS MERCURY
EACH MISCELLANEOUS
Qty: 1 | Refills: 0 | Status: ACTIVE | COMMUNITY
Start: 2024-12-16 | End: 1900-01-01

## 2025-01-03 ENCOUNTER — APPOINTMENT (OUTPATIENT)
Dept: INTERNAL MEDICINE | Facility: CLINIC | Age: 30
End: 2025-01-03
Payer: MEDICAID

## 2025-01-03 ENCOUNTER — OUTPATIENT (OUTPATIENT)
Dept: OUTPATIENT SERVICES | Facility: HOSPITAL | Age: 30
LOS: 1 days | End: 2025-01-03

## 2025-01-03 VITALS
WEIGHT: 135 LBS | HEIGHT: 65 IN | SYSTOLIC BLOOD PRESSURE: 121 MMHG | BODY MASS INDEX: 22.49 KG/M2 | HEART RATE: 88 BPM | OXYGEN SATURATION: 99 % | DIASTOLIC BLOOD PRESSURE: 76 MMHG

## 2025-01-03 DIAGNOSIS — N92.6 IRREGULAR MENSTRUATION, UNSPECIFIED: ICD-10-CM

## 2025-01-03 PROCEDURE — 99213 OFFICE O/P EST LOW 20 MIN: CPT

## 2025-01-04 DIAGNOSIS — N92.6 IRREGULAR MENSTRUATION, UNSPECIFIED: ICD-10-CM

## 2025-01-06 LAB
ALBUMIN SERPL ELPH-MCNC: 5.2 G/DL
ALP BLD-CCNC: 66 U/L
ALT SERPL-CCNC: 11 U/L
ANION GAP SERPL CALC-SCNC: 11 MMOL/L
AST SERPL-CCNC: 16 U/L
BASOPHILS # BLD AUTO: 0.05 K/UL
BASOPHILS NFR BLD AUTO: 0.6 %
BILIRUB SERPL-MCNC: 0.2 MG/DL
BUN SERPL-MCNC: 13 MG/DL
CALCIUM SERPL-MCNC: 10 MG/DL
CHLORIDE SERPL-SCNC: 99 MMOL/L
CHOLEST SERPL-MCNC: 161 MG/DL
CO2 SERPL-SCNC: 28 MMOL/L
CREAT SERPL-MCNC: 0.74 MG/DL
EGFR: 113 ML/MIN/1.73M2
EOSINOPHIL # BLD AUTO: 0.61 K/UL
EOSINOPHIL NFR BLD AUTO: 6.8 %
ESTIMATED AVERAGE GLUCOSE: 154 MG/DL
FSH SERPL-MCNC: 5.7 IU/L
GLUCOSE SERPL-MCNC: 317 MG/DL
HBA1C MFR BLD HPLC: 7 %
HCT VFR BLD CALC: 46.5 %
HDLC SERPL-MCNC: 58 MG/DL
HGB BLD-MCNC: 15.4 G/DL
IMM GRANULOCYTES NFR BLD AUTO: 0.2 %
LDLC SERPL CALC-MCNC: 87 MG/DL
LH SERPL-ACNC: 26.6 IU/L
LYMPHOCYTES # BLD AUTO: 2.27 K/UL
LYMPHOCYTES NFR BLD AUTO: 25.4 %
MAN DIFF?: NORMAL
MCHC RBC-ENTMCNC: 29.6 PG
MCHC RBC-ENTMCNC: 33.1 G/DL
MCV RBC AUTO: 89.3 FL
MONOCYTES # BLD AUTO: 0.45 K/UL
MONOCYTES NFR BLD AUTO: 5 %
NEUTROPHILS # BLD AUTO: 5.53 K/UL
NEUTROPHILS NFR BLD AUTO: 62 %
NONHDLC SERPL-MCNC: 103 MG/DL
PLATELET # BLD AUTO: 367 K/UL
POTASSIUM SERPL-SCNC: 4.3 MMOL/L
PROGEST SERPL-MCNC: 0.4 NG/ML
PROT SERPL-MCNC: 8.4 G/DL
RBC # BLD: 5.21 M/UL
RBC # FLD: 13.1 %
SODIUM SERPL-SCNC: 138 MMOL/L
TRIGL SERPL-MCNC: 83 MG/DL
TSH SERPL-ACNC: 0.67 UIU/ML
WBC # FLD AUTO: 8.93 K/UL

## 2025-01-07 LAB — ESTROGEN SERPL-MCNC: 157 PG/ML

## 2025-01-21 RX ORDER — LANCETS 28 GAUGE
EACH MISCELLANEOUS
Qty: 300 | Refills: 5 | Status: ACTIVE | COMMUNITY
Start: 2025-01-21 | End: 1900-01-01

## 2025-01-21 RX ORDER — BLOOD-GLUCOSE METER
KIT MISCELLANEOUS
Qty: 1 | Refills: 1 | Status: ACTIVE | COMMUNITY
Start: 2025-01-21 | End: 1900-01-01

## 2025-01-21 RX ORDER — BLOOD SUGAR DIAGNOSTIC
STRIP MISCELLANEOUS
Qty: 300 | Refills: 5 | Status: ACTIVE | COMMUNITY
Start: 2025-01-21 | End: 1900-01-01

## 2025-01-31 DIAGNOSIS — N92.6 IRREGULAR MENSTRUATION, UNSPECIFIED: ICD-10-CM

## 2025-01-31 RX ORDER — MEDROXYPROGESTERONE ACETATE 10 MG/1
10 TABLET ORAL DAILY
Qty: 10 | Refills: 0 | Status: ACTIVE | COMMUNITY
Start: 2025-01-31 | End: 1900-01-01

## 2025-02-03 ENCOUNTER — OUTPATIENT (OUTPATIENT)
Dept: OUTPATIENT SERVICES | Facility: HOSPITAL | Age: 30
LOS: 1 days | End: 2025-02-03

## 2025-02-03 ENCOUNTER — APPOINTMENT (OUTPATIENT)
Dept: INTERNAL MEDICINE | Facility: CLINIC | Age: 30
End: 2025-02-03

## 2025-02-04 LAB
DHEA-S SERPL-MCNC: 188 UG/DL
PROLACTIN SERPL-MCNC: 25.2 NG/ML

## 2025-02-08 LAB — DHEA SERPL-MCNC: 330 NG/DL

## 2025-02-09 ENCOUNTER — NON-APPOINTMENT (OUTPATIENT)
Age: 30
End: 2025-02-09

## 2025-02-19 ENCOUNTER — APPOINTMENT (OUTPATIENT)
Dept: ULTRASOUND IMAGING | Facility: IMAGING CENTER | Age: 30
End: 2025-02-19
Payer: MEDICAID

## 2025-02-19 ENCOUNTER — OUTPATIENT (OUTPATIENT)
Dept: OUTPATIENT SERVICES | Facility: HOSPITAL | Age: 30
LOS: 1 days | End: 2025-02-19
Payer: MEDICAID

## 2025-02-19 DIAGNOSIS — N92.6 IRREGULAR MENSTRUATION, UNSPECIFIED: ICD-10-CM

## 2025-02-19 PROCEDURE — 76856 US EXAM PELVIC COMPLETE: CPT

## 2025-02-19 PROCEDURE — 76856 US EXAM PELVIC COMPLETE: CPT | Mod: 26

## 2025-02-26 ENCOUNTER — APPOINTMENT (OUTPATIENT)
Dept: OBGYN | Facility: HOSPITAL | Age: 30
End: 2025-02-26
Payer: MEDICAID

## 2025-02-26 ENCOUNTER — OUTPATIENT (OUTPATIENT)
Dept: OUTPATIENT SERVICES | Facility: HOSPITAL | Age: 30
LOS: 1 days | End: 2025-02-26

## 2025-02-26 VITALS
HEIGHT: 65 IN | HEART RATE: 85 BPM | BODY MASS INDEX: 22.66 KG/M2 | DIASTOLIC BLOOD PRESSURE: 73 MMHG | TEMPERATURE: 97.5 F | SYSTOLIC BLOOD PRESSURE: 110 MMHG | WEIGHT: 136 LBS

## 2025-02-26 DIAGNOSIS — Z01.419 ENCOUNTER FOR GYNECOLOGICAL EXAMINATION (GENERAL) (ROUTINE) W/OUT ABNORMAL FINDINGS: ICD-10-CM

## 2025-02-26 DIAGNOSIS — F32.81 PREMENSTRUAL DYSPHORIC DISORDER: ICD-10-CM

## 2025-02-26 DIAGNOSIS — N92.6 IRREGULAR MENSTRUATION, UNSPECIFIED: ICD-10-CM

## 2025-02-26 PROCEDURE — 99203 OFFICE O/P NEW LOW 30 MIN: CPT | Mod: GE

## 2025-02-27 DIAGNOSIS — N93.9 ABNORMAL UTERINE AND VAGINAL BLEEDING, UNSPECIFIED: ICD-10-CM

## 2025-02-27 DIAGNOSIS — Z01.419 ENCOUNTER FOR GYNECOLOGICAL EXAMINATION (GENERAL) (ROUTINE) WITHOUT ABNORMAL FINDINGS: ICD-10-CM

## 2025-03-03 DIAGNOSIS — N92.6 IRREGULAR MENSTRUATION, UNSPECIFIED: ICD-10-CM

## 2025-03-03 DIAGNOSIS — F32.81 PREMENSTRUAL DYSPHORIC DISORDER: ICD-10-CM

## 2025-03-04 ENCOUNTER — APPOINTMENT (OUTPATIENT)
Dept: ENDOCRINOLOGY | Facility: CLINIC | Age: 30
End: 2025-03-04
Payer: MEDICAID

## 2025-03-04 ENCOUNTER — OUTPATIENT (OUTPATIENT)
Dept: OUTPATIENT SERVICES | Facility: HOSPITAL | Age: 30
LOS: 1 days | End: 2025-03-04

## 2025-03-04 VITALS
DIASTOLIC BLOOD PRESSURE: 79 MMHG | BODY MASS INDEX: 22.66 KG/M2 | WEIGHT: 136 LBS | HEART RATE: 78 BPM | SYSTOLIC BLOOD PRESSURE: 123 MMHG | OXYGEN SATURATION: 99 % | HEIGHT: 65 IN

## 2025-03-04 DIAGNOSIS — E78.5 HYPERLIPIDEMIA, UNSPECIFIED: ICD-10-CM

## 2025-03-04 DIAGNOSIS — E22.1 HYPERPROLACTINEMIA: ICD-10-CM

## 2025-03-04 DIAGNOSIS — E10.9 TYPE 1 DIABETES MELLITUS W/OUT COMPLICATIONS: ICD-10-CM

## 2025-03-04 DIAGNOSIS — E10.9 TYPE 1 DIABETES MELLITUS WITHOUT COMPLICATIONS: ICD-10-CM

## 2025-03-04 PROCEDURE — 99214 OFFICE O/P EST MOD 30 MIN: CPT | Mod: GC

## 2025-03-04 PROCEDURE — G2211 COMPLEX E/M VISIT ADD ON: CPT | Mod: NC

## 2025-04-16 ENCOUNTER — OUTPATIENT (OUTPATIENT)
Dept: OUTPATIENT SERVICES | Facility: HOSPITAL | Age: 30
LOS: 1 days | End: 2025-04-16

## 2025-04-16 VITALS
HEIGHT: 65.75 IN | DIASTOLIC BLOOD PRESSURE: 75 MMHG | SYSTOLIC BLOOD PRESSURE: 113 MMHG | OXYGEN SATURATION: 98 % | WEIGHT: 143.08 LBS | HEART RATE: 81 BPM | TEMPERATURE: 98 F | RESPIRATION RATE: 16 BRPM

## 2025-04-16 DIAGNOSIS — N92.6 IRREGULAR MENSTRUATION, UNSPECIFIED: ICD-10-CM

## 2025-04-16 DIAGNOSIS — Z90.89 ACQUIRED ABSENCE OF OTHER ORGANS: Chronic | ICD-10-CM

## 2025-04-16 DIAGNOSIS — Z97.2 PRESENCE OF DENTAL PROSTHETIC DEVICE (COMPLETE) (PARTIAL): Chronic | ICD-10-CM

## 2025-04-16 DIAGNOSIS — Z01.419 ENCOUNTER FOR GYNECOLOGICAL EXAMINATION (GENERAL) (ROUTINE) WITHOUT ABNORMAL FINDINGS: ICD-10-CM

## 2025-04-16 DIAGNOSIS — E10.9 TYPE 1 DIABETES MELLITUS WITHOUT COMPLICATIONS: ICD-10-CM

## 2025-04-16 RX ORDER — MELATONIN 5 MG
1 TABLET ORAL
Refills: 0 | DISCHARGE

## 2025-04-16 RX ORDER — INSULIN LISPRO 100 U/ML
0 INJECTION, SOLUTION INTRAVENOUS; SUBCUTANEOUS
Refills: 0 | DISCHARGE

## 2025-04-16 RX ORDER — INSULIN DEGLUDEC 100 U/ML
17 INJECTION, SOLUTION SUBCUTANEOUS
Refills: 0 | DISCHARGE

## 2025-04-16 RX ORDER — DUPILUMAB 200 MG/1.14ML
300 INJECTION, SOLUTION SUBCUTANEOUS
Refills: 0 | DISCHARGE

## 2025-04-16 RX ORDER — DIPHENHYDRAMINE HCL 12.5MG/5ML
1 ELIXIR ORAL
Refills: 0 | DISCHARGE

## 2025-04-16 NOTE — H&P PST ADULT - HISTORY OF PRESENT ILLNESS
Pt. is a 30 yo female with a PMHX of fragile X sydrome, eczema, d type I DM, HTN, and HLD.  Pt. presents to PST for encounter Gyn exam general to be evaluated for pelvic examination under anesthesia.      Pt. has never had a Gyn exam.

## 2025-04-16 NOTE — H&P PST ADULT - NSANTHOSAYNRD_GEN_A_CORE
. Dr. Thornton notified. Order to stop insulin infusion and continue current fluids.No changes in orders made. Insulin infusion stopped. Pt vomited maroon colored emesis, Dr. Thornton aware. No new orders received.   No. ARAM screening performed.  STOP BANG Legend: 0-2 = LOW Risk; 3-4 = INTERMEDIATE Risk; 5-8 = HIGH Risk

## 2025-04-16 NOTE — H&P PST ADULT - NSICDXPASTSURGICALHX_GEN_ALL_CORE_FT
PAST SURGICAL HISTORY:  History of insertion of dental endosseous implant     History of tonsillectomy and adenoidectomy

## 2025-04-16 NOTE — H&P PST ADULT - ASSESSMENT
Pt. is a 30 yo female accompanied by her parents.  Pt. has never had a GYN exam.    Pt's. endocrine note states pt. has HTN, not on med., HLD not on med..

## 2025-04-16 NOTE — H&P PST ADULT - PROBLEM SELECTOR PLAN 1
Pt. is scheduled for a pelvic examination under anesthesia 4/24/25.  Pt's. parents verbalized understanding of instructions.

## 2025-04-16 NOTE — H&P PST ADULT - OTHER CARE PROVIDERS
Endocrinologist and Dermatologist at same clinic as PCP, pt's mother states she does not have an assigned Endocrinologist, she sees whoever is available

## 2025-04-16 NOTE — H&P PST ADULT - COMMENTS
has fragile X syndrome, pt. able to follow directions during exam, parents had her wait in the car with the father until time to go into the examination room

## 2025-04-16 NOTE — H&P PST ADULT - PROBLEM SELECTOR PLAN 2
Instructed pt's. parents to give pt. Tresiba 14U the night before surgery and no Lymjev morning of surgery. Instructed pt's. parents to give pt. Tresiba 14U the night before surgery and no Lymjev morning of surgery.  HgBA1c 7.0 on 1/3/25.  Result in chart.

## 2025-04-16 NOTE — H&P PST ADULT - NSICDXPASTMEDICALHX_GEN_ALL_CORE_FT
PAST MEDICAL HISTORY:  Eczema     Type I diabetes mellitus      PAST MEDICAL HISTORY:  Eczema     Fragile X syndrome     HTN, goal below 130/80     Hyperlipidemia     Hyperprolactinemia     Type I diabetes mellitus

## 2025-04-23 PROBLEM — I10 ESSENTIAL (PRIMARY) HYPERTENSION: Chronic | Status: ACTIVE | Noted: 2025-04-16

## 2025-04-23 PROBLEM — Q99.2 FRAGILE X CHROMOSOME: Chronic | Status: ACTIVE | Noted: 2025-04-16

## 2025-04-23 PROBLEM — E22.1 HYPERPROLACTINEMIA: Chronic | Status: ACTIVE | Noted: 2025-04-16

## 2025-04-24 ENCOUNTER — APPOINTMENT (OUTPATIENT)
Dept: OBGYN | Facility: HOSPITAL | Age: 30
End: 2025-04-24

## 2025-04-24 PROBLEM — E10.9 TYPE 1 DIABETES MELLITUS WITHOUT COMPLICATIONS: Chronic | Status: ACTIVE | Noted: 2025-04-16

## 2025-04-24 PROBLEM — L30.9 DERMATITIS, UNSPECIFIED: Chronic | Status: ACTIVE | Noted: 2025-04-16

## 2025-04-24 PROBLEM — E78.5 HYPERLIPIDEMIA, UNSPECIFIED: Chronic | Status: ACTIVE | Noted: 2025-04-16

## 2025-04-28 DIAGNOSIS — J30.9 ALLERGIC RHINITIS, UNSPECIFIED: ICD-10-CM

## 2025-05-08 ENCOUNTER — APPOINTMENT (OUTPATIENT)
Dept: OBGYN | Facility: HOSPITAL | Age: 30
End: 2025-05-08

## 2025-06-12 ENCOUNTER — OUTPATIENT (OUTPATIENT)
Dept: OUTPATIENT SERVICES | Facility: HOSPITAL | Age: 30
LOS: 1 days | End: 2025-06-12

## 2025-06-12 VITALS
TEMPERATURE: 97 F | OXYGEN SATURATION: 98 % | DIASTOLIC BLOOD PRESSURE: 77 MMHG | HEIGHT: 64.5 IN | RESPIRATION RATE: 16 BRPM | WEIGHT: 139.99 LBS | SYSTOLIC BLOOD PRESSURE: 117 MMHG | HEART RATE: 86 BPM

## 2025-06-12 DIAGNOSIS — Z01.419 ENCOUNTER FOR GYNECOLOGICAL EXAMINATION (GENERAL) (ROUTINE) WITHOUT ABNORMAL FINDINGS: ICD-10-CM

## 2025-06-12 DIAGNOSIS — E10.9 TYPE 1 DIABETES MELLITUS WITHOUT COMPLICATIONS: ICD-10-CM

## 2025-06-12 DIAGNOSIS — Z97.2 PRESENCE OF DENTAL PROSTHETIC DEVICE (COMPLETE) (PARTIAL): Chronic | ICD-10-CM

## 2025-06-12 DIAGNOSIS — Z90.89 ACQUIRED ABSENCE OF OTHER ORGANS: Chronic | ICD-10-CM

## 2025-06-12 RX ORDER — DEXTROSE 50 % IN WATER 50 %
25 SYRINGE (ML) INTRAVENOUS ONCE
Refills: 0 | Status: DISCONTINUED | OUTPATIENT
Start: 2025-06-16 | End: 2025-06-30

## 2025-06-12 RX ORDER — DEXTROSE 50 % IN WATER 50 %
15 SYRINGE (ML) INTRAVENOUS ONCE
Refills: 0 | Status: DISCONTINUED | OUTPATIENT
Start: 2025-06-16 | End: 2025-06-30

## 2025-06-12 RX ORDER — SODIUM CHLORIDE 9 G/1000ML
1000 INJECTION, SOLUTION INTRAVENOUS
Refills: 0 | Status: DISCONTINUED | OUTPATIENT
Start: 2025-06-16 | End: 2025-06-30

## 2025-06-12 RX ORDER — DEXTROSE 50 % IN WATER 50 %
12.5 SYRINGE (ML) INTRAVENOUS ONCE
Refills: 0 | Status: DISCONTINUED | OUTPATIENT
Start: 2025-06-16 | End: 2025-06-30

## 2025-06-12 RX ORDER — GLUCAGON 3 MG/1
1 POWDER NASAL ONCE
Refills: 0 | Status: DISCONTINUED | OUTPATIENT
Start: 2025-06-16 | End: 2025-06-30

## 2025-06-12 NOTE — H&P PST ADULT - ENDOCRINE COMMENTS
pt. has Type I DM, blood glucose readings per endo note " 80s-130s, rarely 180s-200s. Before lunch  with occasionally up to 190, before dinner "

## 2025-06-12 NOTE — H&P PST ADULT - NSICDXPASTMEDICALHX_GEN_ALL_CORE_FT
PAST MEDICAL HISTORY:  Eczema     Encounter for gynecological examination (general) (routine) without abnormal findings     Fragile X syndrome     Hyperlipidemia     Hyperprolactinemia     Type I diabetes mellitus

## 2025-06-12 NOTE — H&P PST ADULT - NSANTHOSAYNRD_GEN_A_CORE
No. ARAM screening performed.  STOP BANG Legend: 0-2 = LOW Risk; 3-4 = INTERMEDIATE Risk; 5-8 = HIGH Risk

## 2025-06-12 NOTE — H&P PST ADULT - HISTORY OF PRESENT ILLNESS
29 year old female with a PMHX of DM 1 (diagnosed age 6)  and fragile X syndrome, and HLD; she had missed menstrual cycle in January, She saw her PCP who prescribed Provera 10mg. She took Provera 2/1-2/8, and then she had her period 2/9-2/16. She underwent TAUS which revealed EM 12mm.  presents to PST  scheduled for exam under anesthesia PAP and breast exam 29 year old female with a PMHX of DM 1 (diagnosed age 6)  and fragile X syndrome, and HLD; she had missed menstrual cycle in January, She saw her PCP who prescribed Provera 10mg. She took Provera 2/1-2/8, and then she had her period 2/9-2/16. She underwent TAUS which revealed EM 12mm.  Presents to PST  scheduled for exam under anesthesia PAP and breast exam 29 year old female with a PMHX of DM 1 (diagnosed age 6)  and fragile X syndrome, and HLD; she had missed menstrual cycle in January, She saw her PCP who prescribed Provera 10mg. She took Provera 2/1-2/8, and then she had her period 2/9-2/16. She underwent TAUS which revealed EM 12mm.  Presents to PST  scheduled for pelvic exam under anesthesia PAP and breast exam

## 2025-06-12 NOTE — H&P PST ADULT - NS HP PST ANES REACTION
Patient presented after waiting 30 minutes with no reaction to allergy injections. Discharged from clinic.  Lori Nguyen RN ............   10/10/2017...1:55 PM      No

## 2025-06-12 NOTE — H&P PST ADULT - GENITOURINARY COMMENTS
One incident of missed period, parents report normal cycle One incident of missed period, parents report normal cycle after taking Provera

## 2025-06-12 NOTE — H&P PST ADULT - PROBLEM SELECTOR PLAN 2
Patient mother instructed to consult with endo for Tresiba dose night before Patient mother instructed to consult with endo for Tresiba dose night before procedure, verbalized understanding and agreed to plan

## 2025-06-12 NOTE — H&P PST ADULT - PROBLEM SELECTOR PLAN 1
Patient tentatively scheduled for surgery on: 6/16/25  Provided with verbal and written presurgical instructions  Patient mother instructed to hold NSAIDs, multivitamins and herbal supplements one week prior to surgery

## 2025-06-13 PROBLEM — I10 ESSENTIAL (PRIMARY) HYPERTENSION: Chronic | Status: INACTIVE | Noted: 2025-04-16 | Resolved: 2025-06-12

## 2025-06-13 NOTE — ASU PATIENT PROFILE, ADULT - NS PRO PASSIVE SMOKE EXP
Pt updated regarding xray result.  Pt verbalized understanding with no additional questions.   RN instructed pt to f/u with PCP as soon as possible.         No

## 2025-06-16 ENCOUNTER — TRANSCRIPTION ENCOUNTER (OUTPATIENT)
Age: 30
End: 2025-06-16

## 2025-06-16 ENCOUNTER — OUTPATIENT (OUTPATIENT)
Dept: OUTPATIENT SERVICES | Facility: HOSPITAL | Age: 30
LOS: 1 days | End: 2025-06-16
Payer: MEDICAID

## 2025-06-16 ENCOUNTER — APPOINTMENT (OUTPATIENT)
Dept: OBGYN | Facility: HOSPITAL | Age: 30
End: 2025-06-16

## 2025-06-16 ENCOUNTER — RESULT REVIEW (OUTPATIENT)
Age: 30
End: 2025-06-16

## 2025-06-16 VITALS
SYSTOLIC BLOOD PRESSURE: 134 MMHG | RESPIRATION RATE: 18 BRPM | OXYGEN SATURATION: 100 % | HEART RATE: 91 BPM | HEIGHT: 64.5 IN | WEIGHT: 139.99 LBS | DIASTOLIC BLOOD PRESSURE: 91 MMHG | TEMPERATURE: 98 F

## 2025-06-16 VITALS
SYSTOLIC BLOOD PRESSURE: 115 MMHG | OXYGEN SATURATION: 100 % | RESPIRATION RATE: 14 BRPM | DIASTOLIC BLOOD PRESSURE: 70 MMHG | HEART RATE: 87 BPM

## 2025-06-16 DIAGNOSIS — Z90.89 ACQUIRED ABSENCE OF OTHER ORGANS: Chronic | ICD-10-CM

## 2025-06-16 DIAGNOSIS — Z97.2 PRESENCE OF DENTAL PROSTHETIC DEVICE (COMPLETE) (PARTIAL): Chronic | ICD-10-CM

## 2025-06-16 DIAGNOSIS — Z01.419 ENCOUNTER FOR GYNECOLOGICAL EXAMINATION (GENERAL) (ROUTINE) WITHOUT ABNORMAL FINDINGS: ICD-10-CM

## 2025-06-16 LAB
GLUCOSE BLDC GLUCOMTR-MCNC: 153 MG/DL — HIGH (ref 70–99)
HCG UR QL: NEGATIVE — SIGNIFICANT CHANGE UP

## 2025-06-16 PROCEDURE — 57410 PELVIC EXAMINATION: CPT | Mod: GC

## 2025-06-16 NOTE — BRIEF OPERATIVE NOTE - OPERATION/FINDINGS
Exam under anesthesia revealed diffuse erythematous eczema rash throughout torso, legs, arms, and pelvic area. Breasts appeared grossly normal without masses, lumps, or abnormal discharge. Vagina and cervix appeared normal. Bimanual exam without adnexal masses. 2-3cm hemorrhoid visualized adjacent to the rectum. Pap smear obtained without difficulty.

## 2025-06-16 NOTE — ASU DISCHARGE PLAN (ADULT/PEDIATRIC) - PROVIDER TOKENS
FREE:[LAST:[[facility]],PHONE:[phone]],FAX:[phone]   [phone]    -],ADDRESS:[[place] [address]  [phone]],FOLLOWUP:[2 weeks]]

## 2025-06-16 NOTE — BRIEF OPERATIVE NOTE - NSICDXBRIEFPROCEDURE_GEN_ALL_CORE_FT
PROCEDURES:  Exam under anesthesia, breast 16-Jun-2025 10:24:35  Isabel Horn  Vaginal examination under anesthesia with Papanicolaou smear of cervix 16-Jun-2025 10:24:47  Isabel Horn

## 2025-06-16 NOTE — BRIEF OPERATIVE NOTE - NSICDXBRIEFPOSTOP_GEN_ALL_CORE_FT
POST-OP DIAGNOSIS:  Routine health maintenance 16-Jun-2025 10:25:41  Isabel Horn  Hemorrhoid 16-Jun-2025 10:25:58  Isabel Horn

## 2025-06-16 NOTE — ASU DISCHARGE PLAN (ADULT/PEDIATRIC) - CARE PROVIDER_API CALL
[facility],   Oncology [facility]  [address] [place] [id number]  [phone]  Phone: [phone]  Fax: (   )    -  Follow Up Time: 2 weeks

## 2025-06-16 NOTE — ASU DISCHARGE PLAN (ADULT/PEDIATRIC) - ASU DC SPECIAL INSTRUCTIONSFT
Regular diet as tolerated, regular activity as tolerated, no heavy lifting for first two weeks.  Nothing per vagina: no intercourse, tampons or douching.  Call your provider if you experience fevers, chills, worsening abdominal pain, inability to urinate or worsening vaginal bleeding.  Follow up with your provider in 2 weeks. No change in diet and activity level. Call your provider if you experience fevers, chills, worsening abdominal pain, inability to urinate or worsening vaginal bleeding.  Follow up with your provider in 2 weeks.

## 2025-06-19 LAB — CYTOLOGY SPEC DOC CYTO: SIGNIFICANT CHANGE UP

## 2025-06-27 ENCOUNTER — RX RENEWAL (OUTPATIENT)
Age: 30
End: 2025-06-27

## 2025-06-27 PROBLEM — Z01.419 ENCOUNTER FOR GYNECOLOGICAL EXAMINATION (GENERAL) (ROUTINE) WITHOUT ABNORMAL FINDINGS: Chronic | Status: ACTIVE | Noted: 2025-06-12

## 2025-07-10 RX ORDER — DUPILUMAB 300 MG/2ML
300 INJECTION, SOLUTION SUBCUTANEOUS
Qty: 1 | Refills: 1 | Status: ACTIVE | COMMUNITY
Start: 2025-07-10 | End: 1900-01-01

## 2025-07-11 ENCOUNTER — NON-APPOINTMENT (OUTPATIENT)
Age: 30
End: 2025-07-11

## 2025-08-01 ENCOUNTER — APPOINTMENT (OUTPATIENT)
Dept: DERMATOLOGY | Facility: CLINIC | Age: 30
End: 2025-08-01
Payer: MEDICAID

## 2025-08-01 ENCOUNTER — OUTPATIENT (OUTPATIENT)
Dept: OUTPATIENT SERVICES | Facility: HOSPITAL | Age: 30
LOS: 1 days | End: 2025-08-01

## 2025-08-01 VITALS
SYSTOLIC BLOOD PRESSURE: 118 MMHG | OXYGEN SATURATION: 99 % | BODY MASS INDEX: 22.33 KG/M2 | HEART RATE: 79 BPM | HEIGHT: 65 IN | WEIGHT: 134 LBS | DIASTOLIC BLOOD PRESSURE: 68 MMHG | RESPIRATION RATE: 16 BRPM

## 2025-08-01 DIAGNOSIS — L90.6 STRIAE ATROPHICAE: ICD-10-CM

## 2025-08-01 DIAGNOSIS — Z97.2 PRESENCE OF DENTAL PROSTHETIC DEVICE (COMPLETE) (PARTIAL): Chronic | ICD-10-CM

## 2025-08-01 DIAGNOSIS — Z90.89 ACQUIRED ABSENCE OF OTHER ORGANS: Chronic | ICD-10-CM

## 2025-08-01 DIAGNOSIS — L20.9 ATOPIC DERMATITIS, UNSPECIFIED: ICD-10-CM

## 2025-08-01 PROCEDURE — 99214 OFFICE O/P EST MOD 30 MIN: CPT

## 2025-08-04 ENCOUNTER — RX RENEWAL (OUTPATIENT)
Age: 30
End: 2025-08-04

## 2025-08-19 ENCOUNTER — RX RENEWAL (OUTPATIENT)
Age: 30
End: 2025-08-19

## (undated) DEVICE — DRAPE TOWEL BLUE 17" X 24"

## (undated) DEVICE — PACK D&C

## (undated) DEVICE — DRAPE WARMING SOLUTION 44 X 44"

## (undated) DEVICE — DRAPE LIGHT HANDLE COVER (GREEN)

## (undated) DEVICE — WARMING BLANKET UPPER ADULT

## (undated) DEVICE — VISITEC 4X4

## (undated) DEVICE — BASIN SET DOUBLE

## (undated) DEVICE — DRSG PAD SANITARY OB

## (undated) DEVICE — SOL IRR POUR NS 0.9% 1000ML

## (undated) DEVICE — SOL IRR POUR H2O 500ML

## (undated) DEVICE — VENODYNE/SCD SLEEVE CALF MEDIUM

## (undated) DEVICE — POSITIONER STRAP ARMBOARD VELCRO TS-30

## (undated) DEVICE — PACK PERI GYN

## (undated) DEVICE — LABELS BLANK W PEN

## (undated) DEVICE — PREP BETADINE KIT

## (undated) DEVICE — GLV 7 PROTEXIS (CREAM) MICRO

## (undated) DEVICE — PRESSURE INFUSOR BAG 1000ML

## (undated) DEVICE — DRAPE GYN IRRIGATION POUCH 19 X 23"

## (undated) DEVICE — DRSG TELFA 3 X 8

## (undated) DEVICE — ELCTR BOVIE PENCIL SMOKE EVACUATION

## (undated) DEVICE — TUBING SUCTION NONCONDUCTIVE 6MM X 12FT

## (undated) DEVICE — GOWN LG

## (undated) DEVICE — TUBING IRR SET FOR CYSTOSCOPY 77"